# Patient Record
Sex: FEMALE | Race: WHITE | HISPANIC OR LATINO | Employment: FULL TIME | URBAN - METROPOLITAN AREA
[De-identification: names, ages, dates, MRNs, and addresses within clinical notes are randomized per-mention and may not be internally consistent; named-entity substitution may affect disease eponyms.]

---

## 2019-10-17 ENCOUNTER — GYNECOLOGY VISIT (OUTPATIENT)
Dept: OBGYN | Facility: CLINIC | Age: 34
End: 2019-10-17
Payer: COMMERCIAL

## 2019-10-17 VITALS — WEIGHT: 159 LBS | DIASTOLIC BLOOD PRESSURE: 78 MMHG | SYSTOLIC BLOOD PRESSURE: 118 MMHG

## 2019-10-17 DIAGNOSIS — N93.8 DUB (DYSFUNCTIONAL UTERINE BLEEDING): ICD-10-CM

## 2019-10-17 DIAGNOSIS — Z32.00 ENCOUNTER FOR PREGNANCY TEST, RESULT UNKNOWN: ICD-10-CM

## 2019-10-17 LAB
INT CON NEG: NEGATIVE
INT CON POS: POSITIVE
POC URINE PREGNANCY TEST: POSITIVE

## 2019-10-17 PROCEDURE — 99203 OFFICE O/P NEW LOW 30 MIN: CPT | Mod: 25 | Performed by: OBSTETRICS & GYNECOLOGY

## 2019-10-17 PROCEDURE — 81025 URINE PREGNANCY TEST: CPT | Performed by: OBSTETRICS & GYNECOLOGY

## 2019-10-17 PROCEDURE — 76830 TRANSVAGINAL US NON-OB: CPT | Performed by: OBSTETRICS & GYNECOLOGY

## 2019-10-17 NOTE — PROGRESS NOTES
Chief complaint;  This patient is a 33 y.o. female , No LMP recorded. presents complaining of dysfunctional uterine bleeding.    Review of systems-denies; chest pain, shortness of breath, fever, chills, abdominal pain  Past OB history-  OB History    Para Term  AB Living   1 1 1     1   SAB TAB Ectopic Molar Multiple Live Births             1      # Outcome Date GA Lbr Edgar/2nd Weight Sex Delivery Anes PTL Lv   1 Term 02 39w0d   F Vag-Spont  N ALLIE     Past surgical history-   Past Surgical History:   Procedure Laterality Date   • LUMPECTOMY     • LUMPECTOMY       Past medical history- History reviewed. No pertinent past medical history.  Allergies- Patient has no allergy information on record.  Present medications-   Outpatient Encounter Medications as of 10/17/2019   Medication Sig Dispense Refill   • Prenatal MV-Min-Fe Fum-FA-DHA (PRENATAL 1 PO) Take  by mouth.       No facility-administered encounter medications on file as of 10/17/2019.      Family history- no history of breast or ovarian cancer  Social history-   Social History     Socioeconomic History   • Marital status: Single     Spouse name: Not on file   • Number of children: Not on file   • Years of education: Not on file   • Highest education level: Not on file   Occupational History   • Not on file   Social Needs   • Financial resource strain: Not on file   • Food insecurity:     Worry: Not on file     Inability: Not on file   • Transportation needs:     Medical: Not on file     Non-medical: Not on file   Tobacco Use   • Smoking status: Never Smoker   • Smokeless tobacco: Never Used   Substance and Sexual Activity   • Alcohol use: Not Currently   • Drug use: Never   • Sexual activity: Yes     Partners: Male   Lifestyle   • Physical activity:     Days per week: Not on file     Minutes per session: Not on file   • Stress: Not on file   Relationships   • Social connections:     Talks on phone: Not on file     Gets together: Not  on file     Attends [employer] service: Not on file     Active member of club or organization: Not on file     Attends meetings of clubs or organizations: Not on file     Relationship status: Not on file   • Intimate partner violence:     Fear of current or ex partner: Not on file     Emotionally abused: Not on file     Physically abused: Not on file     Forced sexual activity: Not on file   Other Topics Concern   • Not on file   Social History Narrative   • Not on file         Physical examination;   Alert and oriented x3  General-well-developed well-nourished female in no apparent distress  Vitals:    10/17/19 0855   BP: 118/78   Weight: 72.1 kg (159 lb)     Skin is warm and dry   HEENT-normocephalic,nontraumatic,PERRLA,EOMI  Throat- no edema or erythema  Neck-supple  Cardiovascular-regular rate and rhythm, normal S1-S2 without murmurs or gallops  Lungs-clear to auscultation bilaterally  Back-negative CVA tenderness  Abdomen-nondistended positive bowel sounds soft nontender without masses or hepatosplenomegaly  Pelvic examination;  External genitalia-without lesions  Vagina-no blood, no discharge  Cervix-closed,no gross lesions  Uterus-  12 weeks size, nontender  Adnexa- without mass or tenderness  Extremities-without cyanosis clubbing or edema  Neurologic-grossly intact    Transvaginal ultrasound; as performed and read by myself; intrauterine gestational sac containing sherwood pregnancy positive fetal and cardiac motion crown-rump length consistent with 12 weeks 3 days, EDC 4/27/2020 no free pelvic fluid, no adnexal masses    Impression;  Dysfunctional uterine bleeding-no evidence of ectopic or miscarriage.      Plan;     Needs initial OB-patient will start her OB care with Dr. Salazar      35 Minutes total spent with the patient in face-to-face contact,5 minutes of total time utilized to perform  Ultrasound, greater than 50% of the time has been spent on counseling and coordination of care. Many questions  answered regarding possible miscarriage.

## 2020-04-19 ENCOUNTER — HOSPITAL ENCOUNTER (INPATIENT)
Facility: MEDICAL CENTER | Age: 35
LOS: 1 days | End: 2020-04-20
Attending: OBSTETRICS & GYNECOLOGY | Admitting: OBSTETRICS & GYNECOLOGY
Payer: COMMERCIAL

## 2020-04-19 LAB
BASOPHILS # BLD AUTO: 0.2 % (ref 0–1.8)
BASOPHILS # BLD: 0.03 K/UL (ref 0–0.12)
EOSINOPHIL # BLD AUTO: 0.01 K/UL (ref 0–0.51)
EOSINOPHIL NFR BLD: 0.1 % (ref 0–6.9)
ERYTHROCYTE [DISTWIDTH] IN BLOOD BY AUTOMATED COUNT: 43.4 FL (ref 35.9–50)
ERYTHROCYTE [DISTWIDTH] IN BLOOD BY AUTOMATED COUNT: 44.2 FL (ref 35.9–50)
HCT VFR BLD AUTO: 36.2 % (ref 37–47)
HCT VFR BLD AUTO: 38.8 % (ref 37–47)
HGB BLD-MCNC: 12.2 G/DL (ref 12–16)
HGB BLD-MCNC: 13.7 G/DL (ref 12–16)
HOLDING TUBE BB 8507: NORMAL
IMM GRANULOCYTES # BLD AUTO: 0.04 K/UL (ref 0–0.11)
IMM GRANULOCYTES NFR BLD AUTO: 0.3 % (ref 0–0.9)
LYMPHOCYTES # BLD AUTO: 3.16 K/UL (ref 1–4.8)
LYMPHOCYTES NFR BLD: 25.3 % (ref 22–41)
MCH RBC QN AUTO: 30.8 PG (ref 27–33)
MCH RBC QN AUTO: 31.9 PG (ref 27–33)
MCHC RBC AUTO-ENTMCNC: 33.7 G/DL (ref 33.6–35)
MCHC RBC AUTO-ENTMCNC: 35.3 G/DL (ref 33.6–35)
MCV RBC AUTO: 90.2 FL (ref 81.4–97.8)
MCV RBC AUTO: 91.4 FL (ref 81.4–97.8)
MONOCYTES # BLD AUTO: 1.4 K/UL (ref 0–0.85)
MONOCYTES NFR BLD AUTO: 11.2 % (ref 0–13.4)
NEUTROPHILS # BLD AUTO: 7.86 K/UL (ref 2–7.15)
NEUTROPHILS NFR BLD: 62.9 % (ref 44–72)
NRBC # BLD AUTO: 0 K/UL
NRBC BLD-RTO: 0 /100 WBC
PLATELET # BLD AUTO: 141 K/UL (ref 164–446)
PLATELET # BLD AUTO: 165 K/UL (ref 164–446)
PMV BLD AUTO: 12.2 FL (ref 9–12.9)
PMV BLD AUTO: 12.7 FL (ref 9–12.9)
RBC # BLD AUTO: 3.96 M/UL (ref 4.2–5.4)
RBC # BLD AUTO: 4.3 M/UL (ref 4.2–5.4)
WBC # BLD AUTO: 12.5 K/UL (ref 4.8–10.8)
WBC # BLD AUTO: 16.8 K/UL (ref 4.8–10.8)

## 2020-04-19 PROCEDURE — 85025 COMPLETE CBC W/AUTO DIFF WBC: CPT

## 2020-04-19 PROCEDURE — A9270 NON-COVERED ITEM OR SERVICE: HCPCS | Performed by: OBSTETRICS & GYNECOLOGY

## 2020-04-19 PROCEDURE — 10907ZC DRAINAGE OF AMNIOTIC FLUID, THERAPEUTIC FROM PRODUCTS OF CONCEPTION, VIA NATURAL OR ARTIFICIAL OPENING: ICD-10-PCS | Performed by: OBSTETRICS & GYNECOLOGY

## 2020-04-19 PROCEDURE — 59409 OBSTETRICAL CARE: CPT

## 2020-04-19 PROCEDURE — 770002 HCHG ROOM/CARE - OB PRIVATE (112)

## 2020-04-19 PROCEDURE — 36415 COLL VENOUS BLD VENIPUNCTURE: CPT

## 2020-04-19 PROCEDURE — 0HQ9XZZ REPAIR PERINEUM SKIN, EXTERNAL APPROACH: ICD-10-PCS | Performed by: OBSTETRICS & GYNECOLOGY

## 2020-04-19 PROCEDURE — 700112 HCHG RX REV CODE 229: Performed by: OBSTETRICS & GYNECOLOGY

## 2020-04-19 PROCEDURE — 700105 HCHG RX REV CODE 258

## 2020-04-19 PROCEDURE — 700101 HCHG RX REV CODE 250

## 2020-04-19 PROCEDURE — 85027 COMPLETE CBC AUTOMATED: CPT

## 2020-04-19 PROCEDURE — 700102 HCHG RX REV CODE 250 W/ 637 OVERRIDE(OP): Performed by: OBSTETRICS & GYNECOLOGY

## 2020-04-19 PROCEDURE — 304965 HCHG RECOVERY SERVICES

## 2020-04-19 PROCEDURE — 700111 HCHG RX REV CODE 636 W/ 250 OVERRIDE (IP): Performed by: OBSTETRICS & GYNECOLOGY

## 2020-04-19 RX ORDER — MISOPROSTOL 200 UG/1
800 TABLET ORAL
Status: DISCONTINUED | OUTPATIENT
Start: 2020-04-19 | End: 2020-04-19 | Stop reason: HOSPADM

## 2020-04-19 RX ORDER — BISACODYL 10 MG
10 SUPPOSITORY, RECTAL RECTAL PRN
Status: DISCONTINUED | OUTPATIENT
Start: 2020-04-19 | End: 2020-04-20 | Stop reason: HOSPADM

## 2020-04-19 RX ORDER — ONDANSETRON 4 MG/1
4 TABLET, ORALLY DISINTEGRATING ORAL EVERY 6 HOURS PRN
Status: DISCONTINUED | OUTPATIENT
Start: 2020-04-19 | End: 2020-04-20 | Stop reason: HOSPADM

## 2020-04-19 RX ORDER — IBUPROFEN 600 MG/1
600 TABLET ORAL EVERY 6 HOURS PRN
Status: DISCONTINUED | OUTPATIENT
Start: 2020-04-19 | End: 2020-04-20 | Stop reason: HOSPADM

## 2020-04-19 RX ORDER — CARBOPROST TROMETHAMINE 250 UG/ML
250 INJECTION, SOLUTION INTRAMUSCULAR
Status: DISCONTINUED | OUTPATIENT
Start: 2020-04-19 | End: 2020-04-20 | Stop reason: HOSPADM

## 2020-04-19 RX ORDER — OXYCODONE HYDROCHLORIDE AND ACETAMINOPHEN 5; 325 MG/1; MG/1
1 TABLET ORAL EVERY 4 HOURS PRN
Status: DISCONTINUED | OUTPATIENT
Start: 2020-04-19 | End: 2020-04-20 | Stop reason: HOSPADM

## 2020-04-19 RX ORDER — DOCUSATE SODIUM 100 MG/1
100 CAPSULE, LIQUID FILLED ORAL 2 TIMES DAILY PRN
Status: DISCONTINUED | OUTPATIENT
Start: 2020-04-19 | End: 2020-04-20 | Stop reason: HOSPADM

## 2020-04-19 RX ORDER — SODIUM CHLORIDE, SODIUM LACTATE, POTASSIUM CHLORIDE, CALCIUM CHLORIDE 600; 310; 30; 20 MG/100ML; MG/100ML; MG/100ML; MG/100ML
INJECTION, SOLUTION INTRAVENOUS CONTINUOUS
Status: ACTIVE | OUTPATIENT
Start: 2020-04-19 | End: 2020-04-19

## 2020-04-19 RX ORDER — SODIUM CHLORIDE, SODIUM LACTATE, POTASSIUM CHLORIDE, CALCIUM CHLORIDE 600; 310; 30; 20 MG/100ML; MG/100ML; MG/100ML; MG/100ML
INJECTION, SOLUTION INTRAVENOUS
Status: COMPLETED
Start: 2020-04-19 | End: 2020-04-19

## 2020-04-19 RX ORDER — DEXTROSE, SODIUM CHLORIDE, SODIUM LACTATE, POTASSIUM CHLORIDE, AND CALCIUM CHLORIDE 5; .6; .31; .03; .02 G/100ML; G/100ML; G/100ML; G/100ML; G/100ML
INJECTION, SOLUTION INTRAVENOUS CONTINUOUS
Status: DISCONTINUED | OUTPATIENT
Start: 2020-04-19 | End: 2020-04-20 | Stop reason: HOSPADM

## 2020-04-19 RX ORDER — SODIUM CHLORIDE, SODIUM LACTATE, POTASSIUM CHLORIDE, CALCIUM CHLORIDE 600; 310; 30; 20 MG/100ML; MG/100ML; MG/100ML; MG/100ML
INJECTION, SOLUTION INTRAVENOUS PRN
Status: DISCONTINUED | OUTPATIENT
Start: 2020-04-19 | End: 2020-04-20 | Stop reason: HOSPADM

## 2020-04-19 RX ORDER — DOCUSATE SODIUM 100 MG/1
100 CAPSULE, LIQUID FILLED ORAL 2 TIMES DAILY
Status: DISCONTINUED | OUTPATIENT
Start: 2020-04-19 | End: 2020-04-20 | Stop reason: HOSPADM

## 2020-04-19 RX ORDER — METHYLERGONOVINE MALEATE 0.2 MG/ML
0.2 INJECTION INTRAVENOUS
Status: DISCONTINUED | OUTPATIENT
Start: 2020-04-19 | End: 2020-04-20 | Stop reason: HOSPADM

## 2020-04-19 RX ORDER — OXYCODONE HYDROCHLORIDE AND ACETAMINOPHEN 5; 325 MG/1; MG/1
1 TABLET ORAL EVERY 4 HOURS PRN
Status: DISCONTINUED | OUTPATIENT
Start: 2020-04-19 | End: 2020-04-19

## 2020-04-19 RX ORDER — ONDANSETRON 2 MG/ML
4 INJECTION INTRAMUSCULAR; INTRAVENOUS EVERY 6 HOURS PRN
Status: DISCONTINUED | OUTPATIENT
Start: 2020-04-19 | End: 2020-04-20 | Stop reason: HOSPADM

## 2020-04-19 RX ORDER — OXYCODONE AND ACETAMINOPHEN 10; 325 MG/1; MG/1
1 TABLET ORAL EVERY 4 HOURS PRN
Status: DISCONTINUED | OUTPATIENT
Start: 2020-04-19 | End: 2020-04-20 | Stop reason: HOSPADM

## 2020-04-19 RX ORDER — IBUPROFEN 600 MG/1
600 TABLET ORAL EVERY 6 HOURS PRN
Status: DISCONTINUED | OUTPATIENT
Start: 2020-04-19 | End: 2020-04-19

## 2020-04-19 RX ORDER — MISOPROSTOL 200 UG/1
600 TABLET ORAL
Status: DISCONTINUED | OUTPATIENT
Start: 2020-04-19 | End: 2020-04-20 | Stop reason: HOSPADM

## 2020-04-19 RX ORDER — METHYLERGONOVINE MALEATE 0.2 MG/ML
0.2 INJECTION INTRAVENOUS
Status: DISCONTINUED | OUTPATIENT
Start: 2020-04-19 | End: 2020-04-19 | Stop reason: HOSPADM

## 2020-04-19 RX ORDER — LIDOCAINE HYDROCHLORIDE 10 MG/ML
INJECTION, SOLUTION INFILTRATION; PERINEURAL
Status: COMPLETED
Start: 2020-04-19 | End: 2020-04-19

## 2020-04-19 RX ADMIN — OXYTOCIN 999 ML/HR: 10 INJECTION, SOLUTION INTRAMUSCULAR; INTRAVENOUS at 05:33

## 2020-04-19 RX ADMIN — DOCUSATE SODIUM 100 MG: 100 CAPSULE, LIQUID FILLED ORAL at 21:03

## 2020-04-19 RX ADMIN — LIDOCAINE HYDROCHLORIDE: 10 INJECTION, SOLUTION INFILTRATION; PERINEURAL at 05:45

## 2020-04-19 RX ADMIN — DOCUSATE SODIUM 100 MG: 100 CAPSULE, LIQUID FILLED ORAL at 06:30

## 2020-04-19 RX ADMIN — OXYTOCIN 125 ML/HR: 10 INJECTION, SOLUTION INTRAMUSCULAR; INTRAVENOUS at 06:23

## 2020-04-19 RX ADMIN — IBUPROFEN 600 MG: 600 TABLET ORAL at 06:30

## 2020-04-19 RX ADMIN — SODIUM CHLORIDE, SODIUM LACTATE, POTASSIUM CHLORIDE, CALCIUM CHLORIDE: 600; 310; 30; 20 INJECTION, SOLUTION INTRAVENOUS at 03:52

## 2020-04-19 RX ADMIN — FENTANYL CITRATE 100 MCG: 0.05 INJECTION, SOLUTION INTRAMUSCULAR; INTRAVENOUS at 04:32

## 2020-04-19 RX ADMIN — SODIUM CHLORIDE, POTASSIUM CHLORIDE, SODIUM LACTATE AND CALCIUM CHLORIDE: 600; 310; 30; 20 INJECTION, SOLUTION INTRAVENOUS at 03:52

## 2020-04-19 RX ADMIN — IBUPROFEN 600 MG: 600 TABLET ORAL at 21:03

## 2020-04-19 SDOH — ECONOMIC STABILITY: TRANSPORTATION INSECURITY
IN THE PAST 12 MONTHS, HAS LACK OF TRANSPORTATION KEPT YOU FROM MEETINGS, WORK, OR FROM GETTING THINGS NEEDED FOR DAILY LIVING?: PATIENT DECLINED

## 2020-04-19 SDOH — ECONOMIC STABILITY: FOOD INSECURITY: WITHIN THE PAST 12 MONTHS, YOU WORRIED THAT YOUR FOOD WOULD RUN OUT BEFORE YOU GOT MONEY TO BUY MORE.: PATIENT DECLINED

## 2020-04-19 SDOH — ECONOMIC STABILITY: FOOD INSECURITY: WITHIN THE PAST 12 MONTHS, THE FOOD YOU BOUGHT JUST DIDN'T LAST AND YOU DIDN'T HAVE MONEY TO GET MORE.: PATIENT DECLINED

## 2020-04-19 SDOH — ECONOMIC STABILITY: TRANSPORTATION INSECURITY
IN THE PAST 12 MONTHS, HAS THE LACK OF TRANSPORTATION KEPT YOU FROM MEDICAL APPOINTMENTS OR FROM GETTING MEDICATIONS?: PATIENT DECLINED

## 2020-04-19 ASSESSMENT — LIFESTYLE VARIABLES
EVER FELT BAD OR GUILTY ABOUT YOUR DRINKING: NO
ALCOHOL_USE: NO
EVER_SMOKED: NEVER
DOES PATIENT WANT TO STOP DRINKING: NO
HAVE YOU EVER FELT YOU SHOULD CUT DOWN ON YOUR DRINKING: NO
EVER HAD A DRINK FIRST THING IN THE MORNING TO STEADY YOUR NERVES TO GET RID OF A HANGOVER: NO

## 2020-04-19 ASSESSMENT — PATIENT HEALTH QUESTIONNAIRE - PHQ9
2. FEELING DOWN, DEPRESSED, IRRITABLE, OR HOPELESS: NOT AT ALL
1. LITTLE INTEREST OR PLEASURE IN DOING THINGS: NOT AT ALL
SUM OF ALL RESPONSES TO PHQ9 QUESTIONS 1 AND 2: 0

## 2020-04-19 NOTE — H&P
"  History and Physical    Pura Velarde is a 34 y.o. female  -Para:     Gestational Age:  38w6d  Admitted for:   Active Labor  Admitted to  [facility].  Patient received prenatal care: Dr Salazar    HPI: Patient is admitted with the above mentioned Chief Complaint and States   Loss of fluid:   negative  Abdominal Pain:  positive  Uterine Contractions:  positive  Vaginal Bleeding:  negative  Fetal Movement:  normal  Patient denies fever, chills, nausea, vomiting , headache, visual disturbance, or dysuria  No LMP recorded. Patient is pregnant.  Estimated Date of Delivery: 20  Final ENDER: 2020, by Patient Reported    Patient Active Problem List    Diagnosis Date Noted   • DUB (dysfunctional uterine bleeding) 10/17/2019       Admitting DX: Pregnancy  Labor and delivery, indication for care   Pregnancy Complications:  none  OB Risk Factors:   none  Labor State:    Active phase labor.    History:   has no past medical history on file.     has a past surgical history that includes lumpectomy and lumpectomy.    OB History    Para Term  AB Living   2 1 1     1   SAB TAB Ectopic Molar Multiple Live Births             1      # Outcome Date GA Lbr Edgar/2nd Weight Sex Delivery Anes PTL Lv   2 Current            1 Term 02 39w0d   F Vag-Spont  N ALLIE       Medications:  No current facility-administered medications on file prior to encounter.      Current Outpatient Medications on File Prior to Encounter   Medication Sig Dispense Refill   • Prenatal MV-Min-Fe Fum-FA-DHA (PRENATAL 1 PO) Take  by mouth.         Allergies:  Patient has no known allergies.    ROS:   Neuro: negative    Cardiovascular: negative  Gastro intestinal: negative  Genitourinary: negative            Physical Exam:  Ht 1.651 m (5' 5\")   Wt 81.2 kg (179 lb)   BMI 29.79 kg/m²   Constitutional: healthy-appearing  Cardio: regular rate and rhythm  Lung: CTA bilaterally  Abdomen: gravid, " leopolds 7 #  Extremity: extremities, peripheral pulses and reflexes normal    Cervical Exam: 80%  Cervix Dilatation: 6  Station: negative 1  Pelvis: Normal  Fetal Assessment: Fetal heart variability: moderate  Estimated Fetal Weight: 3000 - 3500g      Labs:  Recent Labs     04/19/20  0340   WBC 12.5*   RBC 4.30   HEMOGLOBIN 13.7   HEMATOCRIT 38.8   MCV 90.2   MCH 31.9   MCHC 35.3*   RDW 43.4   PLATELETCT 165   MPV 12.7     Prenatal Results     General (Most Recent Result)     Test Value Date Time    ABO       Rh       Antibody screen       HbA1c       Gonorrhea       Chlamydia  by PCR       Chlamydia by DNA       RPR/Syphilus       HSV 1/2 by PCR (non-serum)       HSV 1/2 (serum)       HSV 1       HSV 2       HPV (16)       HPV (18)       HPV (other)       HBsAg       HIV-1 HIV-2 Antibodies       Rubella       Tb             Pap Smear (Most Recent Result)     Test Value Date Time    Pap smear       Pap smear w/HPV       Pap smear w/CTNG       Pap smar w/HPV CTNG       Pap smear (reflex HPV ACUS)       Pap smear (reflex HPV ASCUS w/CTNG)       Pathology gyn specimen             Urinalysis (Most Recent Result)     Test Value Date Time    Urinalysis       Urinalysis (culture if indicated)       POC urinalysis       Urine drug screen       Urine drug screen (w/o conf)       Urine culture ([id number])       Urine culture ([id number])             1st Trimester     Test Value Date Time    Hgb       Hct       Fasting Glucose Tolerance       GTT, 1 hour       GTT, 2 hours       GTT, 3 hours             2nd Trimester     Test Value Date Time    Hgb       Hct       Urinalysis       Urine Culture       AST       ALT       Uric Acid       Fasting Glucose Tolerance       GTT, 1 hour       GTT, 2 hours       GTT, 3 hours       Urine Protein/Creatinine Ratio             3rd Trimester     Test Value Date Time    Hgb 13.7 g/dL 04/19/20 0340    Hct 38.8 % 04/19/20 0340    Platelet count 165 K/uL 04/19/20 0340    GBS (LARSON BROTH)        GBS (Direct)       Urinalysis       Urine Culture       Urine Drug Screen       Urine Protein/Creatinine Ratio             Congenital Disease Screening     Test Value Date Time    First Trimester Screen       Quad Screen       Sickle Cell       Thalassemia       Hakeem-Sachs       Cystic Fibrosis Carrier Study                     Assessment:  Gestational Age:  38w6d  Labor State:   Labor, Active  Risk Factors:   none  Pregnancy Complications: none    Patient Active Problem List    Diagnosis Date Noted   • DUB (dysfunctional uterine bleeding) 10/17/2019       Plan:   Admitted for: Active Labor    CBC  Antibiotics: none  Yvonne Salazar M.D.

## 2020-04-19 NOTE — L&D DELIVERY NOTE
Delivery note  , female in LJ position. Placenta intact, 3 vc. 1st degree midline laceration repaired with local lidocaine and 2-0 vicryl ct-1 needle. Apgars 9 and 9, weight : 7# 4.8 oz. Mom and baby doing well.

## 2020-04-19 NOTE — PROGRESS NOTES
"Labor Progress Note    Pura Velarde   38w6d      Subjective:  Pt with contractions since 23:00. Pt was 4 cm upon admission and now 8 cm. Pt declines epidural.  Uterine contractions:yes  Pain: Yes. Severity: moderate    Objective:   Vitals:    20 0300 20 0411   BP:  126/85   Pulse:  75   Temp:  37 °C (98.6 °F)   TempSrc:  Temporal   Weight: 81.2 kg (179 lb)    Height: 1.651 m (5' 5\")      Fetal heart variability: 140's category 1  [place]: q 2  SVE: 8/c/0, arom, clear      Membranes ruptured: .yes, clear    Meds:   Epidural : .no  Pitocin: .no    Labs:  Recent Results (from the past 24 hour(s))   Hold Blood Bank Specimen (Not Tested)    Collection Time: 20  3:40 AM   Result Value Ref Range    Holding Tube - Bb DONE    CBC WITH DIFFERENTIAL    Collection Time: 20  3:40 AM   Result Value Ref Range    WBC 12.5 (H) 4.8 - 10.8 K/uL    RBC 4.30 4.20 - 5.40 M/uL    Hemoglobin 13.7 12.0 - 16.0 g/dL    Hematocrit 38.8 37.0 - 47.0 %    MCV 90.2 81.4 - 97.8 fL    MCH 31.9 27.0 - 33.0 pg    MCHC 35.3 (H) 33.6 - 35.0 g/dL    RDW 43.4 35.9 - 50.0 fL    Platelet Count 165 164 - 446 K/uL    MPV 12.7 9.0 - 12.9 fL    Neutrophils-Polys 62.90 44.00 - 72.00 %    Lymphocytes 25.30 22.00 - 41.00 %    Monocytes 11.20 0.00 - 13.40 %    Eosinophils 0.10 0.00 - 6.90 %    Basophils 0.20 0.00 - 1.80 %    Immature Granulocytes 0.30 0.00 - 0.90 %    Nucleated RBC 0.00 /100 WBC    Neutrophils (Absolute) 7.86 (H) 2.00 - 7.15 K/uL    Lymphs (Absolute) 3.16 1.00 - 4.80 K/uL    Monos (Absolute) 1.40 (H) 0.00 - 0.85 K/uL    Eos (Absolute) 0.01 0.00 - 0.51 K/uL    Baso (Absolute) 0.03 0.00 - 0.12 K/uL    Immature Granulocytes (abs) 0.04 0.00 - 0.11 K/uL    NRBC (Absolute) 0.00 K/uL       Assessment:   38w6d/active labor-expt   GBBS-negative  AMA- neg NIPT and neg MSAFP, pt declined MFM referral.  Fetal status-reassuring        Yvonne Salazar M.D.          "

## 2020-04-19 NOTE — PROGRESS NOTES
Pt up from L&D via wheelchair. Assessment complete. VSS. Fundus firm, lochia light. Pt oriented to room, call light, emergency light, bulb syringe and putting baby on back to sleep. Call light within reach. Will continue to monitor.

## 2020-04-19 NOTE — PROGRESS NOTES
Late Entry    Report received from AIDE Lao RN. Pt remained firm/light/U. Pt denied needs at this time.     Pt ambulated to BR. Was Unable to void. Pericare provided. Pt ambulated to wheelchair and was transferred to PPU. BS report given to Lila PERES.

## 2020-04-19 NOTE — CARE PLAN
Problem: Altered physiologic condition related to immediate post-delivery state and potential for bleeding/hemorrhage  Goal: Patient physiologically stable as evidenced by normal lochia, palpable uterine involution and vital signs within normal limits  Outcome: PROGRESSING AS EXPECTED   Fundus firm, lochia light  Problem: Potential for postpartum infection related to presence of episiotomy/vaginal tear and/or uterine contamination  Goal: Patient will be absent from signs and symptoms of infection  Outcome: PROGRESSING AS EXPECTED   Pt remains afebrile and free from signs of infection

## 2020-04-19 NOTE — L&D DELIVERY NOTE
DATE OF SERVICE:  2020    ADMITTING DIAGNOSES:  1.  Intrauterine pregnancy at 38 weeks and 6 days.  2.  Active labor.  3.  Advanced maternal age, declined Maternal Fetal Medicine referral.  I had   negative noninvasive prenatal testing, negative maternal serum   alpha-fetoprotein.  4.  Group B streptococcus negative.    PROCEDURES:  1.  Admission to labor and delivery.  2.  IV fentanyl for pain.  3.  Normal spontaneous vaginal delivery of a vigorous female with Apgars 9 and   9.    PROCEDURE:  This patient is a 34-year-old  2, para 1 that was admitted   at 38 weeks and 6 days in active labor.  When the patient arrived, she was 4   cm dilated.  She was admitted.  She declined an epidural.  She progressed to 8   cm dilated and at 4:38 a.m. when she was 8 cm dilated, she had artificial   rupture of membranes, which was clear.  The fetal status was reassuring,   category 1.  No antibiotics were given since she was GBS negative.  The   patient then progressed to complete at 5:08 in the morning.  She was then   prepped and draped in the usual sterile fashion.  At 5:32, I assisted with a   normal spontaneous vaginal delivery of a vigorous female in LJ position.  The   head was delivered atraumatically and the rest of the infant delivered   without any problems.  Mouth and nose bulb suctioned.  Infant placed on   maternal abdomen.  Delayed cord clamping was performed for 2-1/2 minutes and   then the cord was doubly clamped and cut by the infant's father.  The placenta   was then delivered intact at 5:38 a.m.  Uterus is firm and hemostatic with an   EBL of 300.  The patient did have a first-degree midline laceration that was   repaired with local lidocaine and 2-0 Vicryl on a CT-1 needle without any   problems.  The patient tolerated the procedure well.  The patient and baby are   doing well.  This was again a vigorous female with Apgars 9 and 9.  Infant's   weight 3310 g or 7 pounds 4.8 ounces.  .        ____________________________________     MD BRIGIDA RAMIREZ / SHANEKA    DD:  04/19/2020 06:08:57  DT:  04/19/2020 08:33:06    D#:  [id number]  Job#:  [id number]

## 2020-04-19 NOTE — PROGRESS NOTES
ENDER   38.6 weeks    Pt presents in active labor. Pt reports +FM and denies VB or LOF. EFM and TOCO applied. VSS. SVE -/-2    Report to Dr Salazar. Orders received to admit.    0435_ Dr Salazar @ [facility]. ROM clear fluid. SVE /-2    0532_  viable female apgars 9    0538_ Placenta delivered S/I

## 2020-04-19 NOTE — H&P
CHIEF COMPLAINT:  Contractions since 11:00 p.m.    HISTORY OF PRESENT ILLNESS:  This patient is a 34-year-old  2, para 1,    female who has had prenatal care with me.  She comes in   complaining of contractions.  She was found to be 4 cm dilated and has now   changed to 8 cm.  The patient's prenatal care has been uncomplicated.  She is   GBS negative.  The patient declines an epidural.    PAST MEDICAL HISTORY:  None.    PAST SURGICAL HISTORY:  None.    MEDICINES:  She is on prenatal vitamins.    ALLERGIES:  No known drug allergies.    OBSTETRICAL HISTORY:  She is a  2, para 1.  Previously, the patient had   a normal spontaneous vaginal delivery at 39 weeks, uncomplicated delivery, 5   pounds 12 ounces.    GYNECOLOGIC HISTORY:  The patient started menstruating at age 10, has   menstrual cycles every 28 days, last about 5 days.  No history of STDs.  No   history of HSV.  Her last Pap smear was in 2019, it was negative.    PHYSICAL EXAMINATION:  VITAL SIGNS:  Stable.  She is afebrile.  GENERAL:  Pleasant female, in some distress secondary to contractions.  LUNGS:  Clear to auscultation bilaterally.  CARDIOVASCULAR:  Regular rate and rhythm.  No murmur.  ABDOMEN:  Soft, gravid.  Leopold's to 7-1/2 pounds.  EXTREMITIES:  No calf tenderness.  PELVIC:  Fetal heart tones 130s, category 1.  [place], aaron every 1-2   minutes.  Sterile vaginal exam, 8 cm dilated, completely effaced and 0   station.  Artificial rupture of membranes performed and it was clear.    LABORATORY DATA:  The patient is GBS negative.  RPR nonreactive.  TSH is   normal at 1.60.  H and H 13.0 and 38.4.  Her 1-hour glucose was 122.    Noninvasive prenatal test negative.  She has a female.  MSAFP is negative.    The rest of her prenatal labs, the patient is Rh positive, she is O positive,   antibody screen negative, HIV nonreactive, hepatitis B surface antigen   negative, rubella immune.  Negative cystic fibrosis, negative  Fragile X,   negative SMA.  GBS is negative.    ASSESSMENT AND PLAN:  A 34-year-old  2, para 1, at 38 weeks and 6 days   by dates and ultrasound.  1.  Active labor.  The patient has been admitted.  We will expect a normal   spontaneous vaginal delivery.  She declines an epidural.  2.  Group B Streptococcus negative.  3.  Advanced maternal age, negative noninvasive prenatal testing, negative   maternal serum alpha-fetoprotein.  The patient declined maternal-fetal   medicine referral.  4.  Fetal status reassuring.       ____________________________________     MD FACUNDO RAMIREZH / NTS    DD:  2020 05:05:01  DT:  2020 06:05:12    D#:  [id number]  Job#:  [id number]

## 2020-04-20 VITALS
WEIGHT: 179 LBS | DIASTOLIC BLOOD PRESSURE: 74 MMHG | SYSTOLIC BLOOD PRESSURE: 115 MMHG | RESPIRATION RATE: 16 BRPM | HEART RATE: 60 BPM | BODY MASS INDEX: 29.82 KG/M2 | OXYGEN SATURATION: 98 % | TEMPERATURE: 98.4 F | HEIGHT: 65 IN

## 2020-04-20 PROCEDURE — 700112 HCHG RX REV CODE 229: Performed by: OBSTETRICS & GYNECOLOGY

## 2020-04-20 PROCEDURE — A9270 NON-COVERED ITEM OR SERVICE: HCPCS | Performed by: OBSTETRICS & GYNECOLOGY

## 2020-04-20 RX ORDER — IBUPROFEN 600 MG/1
600 TABLET ORAL EVERY 6 HOURS PRN
Qty: 30 TAB | Refills: 60 | Status: SHIPPED | OUTPATIENT
Start: 2020-04-20 | End: 2024-01-15

## 2020-04-20 RX ADMIN — DOCUSATE SODIUM 100 MG: 100 CAPSULE, LIQUID FILLED ORAL at 06:20

## 2020-04-20 ASSESSMENT — EDINBURGH POSTNATAL DEPRESSION SCALE (EPDS)
I HAVE BEEN SO UNHAPPY THAT I HAVE HAD DIFFICULTY SLEEPING: NOT AT ALL
THINGS HAVE BEEN GETTING ON TOP OF ME: NO, MOST OF THE TIME I HAVE COPED QUITE WELL
I HAVE BLAMED MYSELF UNNECESSARILY WHEN THINGS WENT WRONG: NOT VERY OFTEN
I HAVE BEEN ANXIOUS OR WORRIED FOR NO GOOD REASON: NO, NOT AT ALL
THE THOUGHT OF HARMING MYSELF HAS OCCURRED TO ME: NEVER
I HAVE LOOKED FORWARD WITH ENJOYMENT TO THINGS: AS MUCH AS I EVER DID
I HAVE BEEN SO UNHAPPY THAT I HAVE BEEN CRYING: NO, NEVER
I HAVE BEEN ABLE TO LAUGH AND SEE THE FUNNY SIDE OF THINGS: AS MUCH AS I ALWAYS COULD
I HAVE FELT SCARED OR PANICKY FOR NO GOOD REASON: NO, NOT AT ALL
I HAVE FELT SAD OR MISERABLE: NO, NOT AT ALL

## 2020-04-20 NOTE — DISCHARGE INSTRUCTIONS
POSTPARTUM DISCHARGE INSTRUCTIONS FOR MOM    YOB: 1985   Age: 34 y.o.               Admit Date: 4/19/2020     Discharge Date: 4/20/2020  Attending Doctor:  Yvonne Salazar M.D.                  Allergies:  Patient has no known allergies.    Discharged to home by car. Discharged via wheelchair, hospital escort: Yes.  Special equipment needed: Not Applicable  Belongings with: Personal  Be sure to schedule a follow-up appointment with your primary care doctor or any specialists as instructed.     Discharge Plan:   Diet Plan: Discussed  Activity Level: Discussed  Confirmed Follow up Appointment: Appointment Scheduled  Confirmed Symptoms Management: Discussed  Medication Reconciliation Updated: No (Comments)  Influenza Vaccine Indication: Not indicated: Previously immunized this influenza season and > 8 years of age    REASONS TO CALL YOUR OBSTETRICIAN:  1.   Persistent fever or shaking chills (Temperature higher than 100.4)  2.   Heavy bleeding (soaking more than 1 pad per hour); Passing clots  3.   Foul odor from vagina  4.   Mastitis (Breast infection; breast pain, chills, fever, redness)  5.   Urinary pain, burning or frequency  6.   Severe depression longer than 24 hours    HAND WASHING  · Prior to handling the baby.  · Before breastfeeding or bottle feeding baby.  · After using the bathroom or changing the baby's diaper.    VAGINAL CARE  · Nothing inside vagina for 6 weeks: no sexual intercourse, tampons or douching.  · Bleeding may continue for 2-4 weeks.  Amount may vary.    · Call your physician for heavy bleeding which means soaking more than 1 pad per hour    BIRTH CONTROL  · It is possible to become pregnant at any time after delivery and while breastfeeding.  · Plan to discuss a method of birth control with your physician at your follow up visit. visit.    DIET AND ELIMINATION  · Eating more fiber (bran cereal, fruits, and vegetables) and drinking plenty of fluids will help to avoid  "constipation.  · Urinary frequency after childbirth is normal.    POSTPARTUM BLUES  During the first few days after birth, you may experience a sense of the \"blues\" which may include impatience, irritability or even crying.  These feeling come and go quickly.  However, as many as 1 in 10 women experience emotional symptoms known as postpartum depression.    Postpartum depression:  May start as early as the second or third day after delivery or take several weeks or months to develop.  Symptoms of \"blues\" are present, but are more intense:  Crying spells; loss of appetite; feelings of hopelessness or loss of control; fear of touching the baby; over concern or no concern at all about the baby; little or no concern about your own appearance/caring for yourself; and/or inability to sleep or excessive sleeping.  Contact your physician if you are experiencing any of these symptoms.    Crisis Hotline:  · [place] Crisis Hotline:  9-736-HRZTNVC  Or 1-704.763.1314  · [place] Crisis Hotline:  1-943.762.7067  Or 217-246-6731    PREVENTING SHAKEN BABY:  If you are angry or stressed, PUT THE BABY IN THE CRIB, step away, take some deep breaths, and wait until you are calm to care for the baby.  DO NOT SHAKE THE BABY.  You are not alone, call a supporter for help.    · Crisis Call Center 24/7 crisis line 095-769-7423 or 1-140.454.1780  · You can also text them, text \"ANSWER\" to 164544    QUIT SMOKING/TOBACCO USE:  I understand the use of any tobacco products increases my chance of suffering from future heart disease and could cause other illnesses which may shorten my life. Quitting the use of tobacco products is the single most important thing I can do to improve my health. For further information on smoking / tobacco cessation call a Toll Free Quit Line at 1-220.828.3747 (*[employer] [place]) or 1-739.759.3287 (American Lung Association) or you can access the web based program at www.lungusa.org.    · [place] Tobacco Users " Help Line:  (389) 509-4240       Toll Free: 1-353.913.4858  · Quit Tobacco Program [facility] (440)552-8478    DEPRESSION / SUICIDE RISK:  As you are discharged from this [facility], it is important to learn how to keep safe from harming yourself.    Recognize the warning signs:  · Abrupt changes in personality, positive or negative- including increase in energy   · Giving away possessions  · Change in eating patterns- significant weight changes-  positive or negative  · Change in sleeping patterns- unable to sleep or sleeping all the time   · Unwillingness or inability to communicate  · Depression  · Unusual sadness, discouragement and loneliness  · Talk of wanting to die  · Neglect of personal appearance   · Rebelliousness- reckless behavior  · Withdrawal from people/activities they love  · Confusion- inability to concentrate     If you or a loved one observes any of these behaviors or has concerns about self-harm, here's what you can do:  · Talk about it- your feelings and reasons for harming yourself  · Remove any means that you might use to hurt yourself (examples: pills, rope, extension cords, firearm)  · Get professional help from the community (Mental Health, Substance Abuse, psychological counseling)  · Do not be alone:Call your Safe Contact- someone whom you trust who will be there for you.  · Call your local CRISIS HOTLINE 700-1377 or 561-140-6972  · Call your local Children's Mobile Crisis Response Team [place] (548) 229-2888 or www.[employer]  · Call the toll free National Suicide Prevention Hotlines   · National Suicide Prevention Lifeline 510-018-ZYMU (2047)  · National Hope Line Network 800-SUICIDE (470-4785)    DISCHARGE SURVEY:  Thank you for choosing [facility].  We hope we provided you with very good care.  You may be receiving a survey in the mail.  Please fill it out.  Your opinion is valuable to us.    ADDITIONAL EDUCATIONAL MATERIALS GIVEN TO  PATIENT: INJOY education deng        My signature on this form indicates that:  1.  I have reviewed and understand the above information  2.  My questions regarding this information have been answered to my satisfaction.  3.  I have formulated a plan with my discharge nurse to obtain my prescribed medication for home.

## 2020-04-20 NOTE — PROGRESS NOTES
Mother and infant discharge instructions complete by Paulina (discharge RN), parents have no other questions at this time and verbalize understanding of follow-up appointments and when to call MD; mother and infant assessment and VS at baseline; infant placed in car seat by parent and they were escorted out to car by a CNA

## 2020-04-20 NOTE — DISCHARGE SUMMARY
Discharge Summary:      Pura Velarde    Admit Date:   2020  Discharge Date:  2020     Admitting diagnosis:  Pregnancy  Labor and delivery, indication for care  Discharge Diagnosis: Status post vaginal, spontaneous.  Pregnancy Complications: none  Tubal Ligation:  no        History:  No past medical history on file.  OB History    Para Term  AB Living   2 2 2     2   SAB TAB Ectopic Molar Multiple Live Births           0 2      # Outcome Date GA Lbr Edgar/2nd Weight Sex Delivery Anes PTL Lv   2 Term 20 38w6d / 00:24 3.31 kg (7 lb 4.8 oz) F Vag-Spont Local N ALLIE   1 Term 02 39w0d   F Vag-Spont  N ALLIE        Patient has no known allergies.  Patient Active Problem List    Diagnosis Date Noted   • DUB (dysfunctional uterine bleeding) 10/17/2019        Hospital Course:   34 y.o. , now para 2, was admitted with the above mentioned diagnosis, underwent Active Labor, vaginal, spontaneous. Patient postpartum course was unremarkable, with progressive advancement in diet , ambulation and toleration of oral analgesia. Patient without complaints today and desires discharge.      Vitals:    20 1000 20 1400 20 1800 20 0600   BP: 123/71 116/76 113/77 115/74   Pulse: 72 72 67 60   Resp: 18 16 16 16   Temp: 36.7 °C (98.1 °F) 36.4 °C (97.6 °F) 36.7 °C (98 °F) 36.9 °C (98.4 °F)   TempSrc: Temporal Temporal Temporal Temporal   SpO2: 96% 98% 96% 98%   Weight:       Height:           Current Facility-Administered Medications   Medication Dose   • D5LR infusion     • ondansetron (ZOFRAN ODT) dispertab 4 mg  4 mg    Or   • ondansetron (ZOFRAN) syringe/vial injection 4 mg  4 mg   • oxytocin (PITOCIN) infusion (for postpartum)   mL/hr   • ibuprofen (MOTRIN) tablet 600 mg  600 mg   • oxyCODONE-acetaminophen (PERCOCET) 5-325 MG per tablet 1 Tab  1 Tab   • oxyCODONE-acetaminophen (PERCOCET-10)  MG per tablet 1 Tab  1 Tab   • LR infusion     • PRN  oxytocin (PITOCIN) (20 Units/1000 mL) PRN for excessive uterine bleeding - See Admin Instr  125-999 mL/hr   • miSOPROStol (CYTOTEC) tablet 600 mcg  600 mcg   • methylergonovine (METHERGINE) injection 0.2 mg  0.2 mg   • carboPROST (HEMABATE) injection 250 mcg  250 mcg   • docusate sodium (COLACE) capsule 100 mg  100 mg   • magnesium hydroxide (MILK OF MAGNESIA) suspension 30 mL  30 mL   • bisacodyl (DULCOLAX) suppository 10 mg  10 mg   • docusate sodium (COLACE) capsule 100 mg  100 mg       Exam:  Breast Exam: negative  Abdomen: Abdomen soft, non-tender. BS normal. No masses,  No organomegaly  Fundus Non Tender: no  Extremity: extremities, peripheral pulses and reflexes normal     Labs:  Recent Labs     04/19/20  0340 04/19/20  1502   WBC 12.5* 16.8*   RBC 4.30 3.96*   HEMOGLOBIN 13.7 12.2   HEMATOCRIT 38.8 36.2*   MCV 90.2 91.4   MCH 31.9 30.8   MCHC 35.3* 33.7   RDW 43.4 44.2   PLATELETCT 165 141*   MPV 12.7 12.2        Activity:   Discharge to home  Pelvic Rest x 6 weeks    Assessment:  normal postpartum course  Discharge Assessment: No areas of skin breakdown/redness; surgical incision intact/healing     Follow up:6 weeks with Dr Salazar     Discharge Meds:   Current Outpatient Medications   Medication Sig Dispense Refill   • ibuprofen (MOTRIN) 600 MG Tab Take 1 Tab by mouth every 6 hours as needed (For cramping after delivery; do not give if patient is receiving ketorolac (Toradol)). 30 Tab 60       Yvonne Salazar M.D.

## 2020-04-20 NOTE — PROGRESS NOTES
Assumed care. Assessment completed, fundus firm, lochia light. POC reviewed, verbalized understanding. Requests ibuprofen at this time and will call if further pain med intervention needed.   Patient is breastfeeding infant without problems.

## 2020-04-20 NOTE — CARE PLAN
Problem: Altered physiologic condition related to immediate post-delivery state and potential for bleeding/hemorrhage  Goal: Patient physiologically stable as evidenced by normal lochia, palpable uterine involution and vital signs within normal limits  Outcome: PROGRESSING AS EXPECTED  Note: Fundus firm, lochia light. Vital signs remain stable.     Problem: Alteration in comfort related to episiotomy, vaginal repair and/or after birth pains  Goal: Patient is able to ambulate, care for self and infant  Outcome: PROGRESSING AS EXPECTED  Note: Patient is ambulating independently and caring for self and infant. Makes needs known appropriately. Prefers to request pain medications as needed.

## 2020-04-20 NOTE — PROGRESS NOTES
Pt discharge instructions provided. Checked armbands. Clamp and cuddles removed.  Pt states she will call when they are ready for car seat check. No further questions at this time. Iliana RN updated.

## 2022-07-01 ENCOUNTER — TELEPHONE (OUTPATIENT)
Dept: SCHEDULING | Facility: IMAGING CENTER | Age: 37
End: 2022-07-01

## 2022-07-26 ENCOUNTER — OFFICE VISIT (OUTPATIENT)
Dept: MEDICAL GROUP | Facility: PHYSICIAN GROUP | Age: 37
End: 2022-07-26
Payer: COMMERCIAL

## 2022-07-26 VITALS
HEART RATE: 88 BPM | OXYGEN SATURATION: 97 % | SYSTOLIC BLOOD PRESSURE: 110 MMHG | DIASTOLIC BLOOD PRESSURE: 62 MMHG | HEIGHT: 65 IN | WEIGHT: 180 LBS | TEMPERATURE: 98.5 F | BODY MASS INDEX: 29.99 KG/M2

## 2022-07-26 DIAGNOSIS — Z00.00 WELLNESS EXAMINATION: ICD-10-CM

## 2022-07-26 DIAGNOSIS — Z23 NEED FOR VACCINATION: ICD-10-CM

## 2022-07-26 PROBLEM — N93.8 DUB (DYSFUNCTIONAL UTERINE BLEEDING): Status: RESOLVED | Noted: 2019-10-17 | Resolved: 2022-07-26

## 2022-07-26 PROCEDURE — 99395 PREV VISIT EST AGE 18-39: CPT | Performed by: NURSE PRACTITIONER

## 2022-07-26 ASSESSMENT — PATIENT HEALTH QUESTIONNAIRE - PHQ9: CLINICAL INTERPRETATION OF PHQ2 SCORE: 0

## 2022-07-26 NOTE — PROGRESS NOTES
Subjective:     CC:   Chief Complaint   Patient presents with   • Establish Care       HPI:   Pura RED is a 36 y.o. female who presents for annual exam. She is feeling well and denies any complaints.    Ob-Gyn/ History:    Patient has GYN provider: yes  /Para:  2/2  Last Pap Smear:  . no history of abnormal pap smears.  Gyn Surgery:  none.  Current Contraceptive Method:  IUD, paragard. yes currently sexually active.  Last menstrual period:  2 weeks ago.  Periods regular. light bleeding. Cramping is mild.   She does not take OTC analgesics for cramps.  No significant bloating/fluid retention, pelvic pain, or dyspareunia. No vaginal discharge    Health Maintenance  Cholesterol Screening: ordered   Diabetes Screening: ordered   Diet: healthy    Exercise: walking   Substance Abuse: none   Safe in relationship.   Seat belts, bike helmet, gun safety discussed.  Sun protection used.    Cancer screening  Cervical Cancer Screening: up to date     Infectious disease screening/Immunizations  --STI Screening: up to date   --Practices safe sex.  --HIV Screening: completed   --Immunizations: up to date, patient will update COVID vaccine prior to trip to [place]    She  has no past medical history on file.  She  has a past surgical history that includes lumpectomy and lumpectomy.    Family History   Problem Relation Age of Onset   • Hypertension Mother    • Diabetes Father         type2       Social History     Socioeconomic History   • Marital status: Single     Spouse name: Not on file   • Number of children: Not on file   • Years of education: Not on file   • Highest education level: Not on file   Occupational History   • Not on file   Tobacco Use   • Smoking status: Never Smoker   • Smokeless tobacco: Never Used   Vaping Use   • Vaping Use: Never used   Substance and Sexual Activity   • Alcohol use: Not Currently     Comment: occ   • Drug use: Never   • Sexual activity: Yes     Partners: Male      "Birth control/protection: I.U.D.     Comment: paragard   Other Topics Concern   • Not on file   Social History Narrative   • Not on file     Social Determinants of Health     Financial Resource Strain: Not on file   Food Insecurity: Not on file   Transportation Needs: Not on file   Physical Activity: Not on file   Stress: Not on file   Social Connections: Not on file   Intimate Partner Violence: Not on file   Housing Stability: Not on file       There are no problems to display for this patient.        Current Outpatient Medications   Medication Sig Dispense Refill   • PARAGARD INTRAUTERINE COPPER IU 1 Each by Intrauterine route one time. 6/2022     • ibuprofen (MOTRIN) 600 MG Tab Take 1 Tab by mouth every 6 hours as needed (For cramping after delivery; do not give if patient is receiving ketorolac (Toradol)). 30 Tab 60     No current facility-administered medications for this visit.     No Known Allergies    Review of Systems   Constitutional: Negative for fever, chills and malaise/fatigue.   HENT: Negative for congestion.    Eyes: Negative for pain.   Respiratory: Negative for cough and shortness of breath.    Cardiovascular: Negative for leg swelling.   Gastrointestinal: Negative for nausea, vomiting, abdominal pain and diarrhea.   Genitourinary: Negative for dysuria and hematuria.   Skin: Negative for rash.   Neurological: Negative for dizziness, focal weakness and headaches.   Endo/Heme/Allergies: Does not bruise/bleed easily.   Psychiatric/Behavioral: Negative for depression.  The patient is not nervous/anxious.      Objective:     /62 (BP Location: Left arm, Patient Position: Sitting, BP Cuff Size: Adult)   Pulse 88   Temp 36.9 °C (98.5 °F) (Temporal)   Ht 1.651 m (5' 5\")   Wt 81.6 kg (180 lb)   SpO2 97%   BMI 29.95 kg/m²   Body mass index is 29.95 kg/m².  Wt Readings from Last 4 Encounters:   07/26/22 81.6 kg (180 lb)   04/19/20 81.2 kg (179 lb)   10/17/19 72.1 kg (159 lb)       Physical " Exam:  Constitutional: Well-developed and well-nourished. Not diaphoretic. No distress.   Skin: Skin is warm and dry. No rash noted.  Head: Atraumatic without lesions.  Eyes: Conjunctivae and extraocular motions are normal. Pupils are equal, round, and reactive to light. No scleral icterus.   Ears:  External ears unremarkable. Tympanic membranes clear and intact.  Nose: Nares patent. Septum midline. Turbinates without erythema nor edema. No discharge.   Mouth/Throat: Dentition is WNL. Tongue normal. Oropharynx is clear and moist. Posterior pharynx without erythema or exudates.  Neck: Supple, trachea midline. Normal range of motion. No thyromegaly present. No lymphadenopathy--cervical or supraclavicular.  Cardiovascular: Regular rate and rhythm, S1 and S2 without murmur, rubs, or gallops.  Lungs: Normal inspiratory effort, CTA bilaterally, no wheezes/rhonchi/rales  Abdomen: Soft, non tender, and without distention. Active bowel sounds in all four quadrants. No rebound, guarding, masses or HSM.  Extremities: No cyanosis, clubbing, erythema, nor edema. Distal pulses intact and symmetric.   Musculoskeletal: All major joints AROM full in all directions without pain.  Neurological: Alert and oriented x 3. DTRs 2+/3 and symmetric. No cranial nerve deficit. 5/5 myotomes. Sensation intact. Negative Rhomberg.  Psychiatric:  Behavior, mood, and affect are appropriate.      Assessment and Plan:     1. Need for vaccination  Tdap Vaccine =>8YO IM   2. Wellness examination  Comp Metabolic Panel (fasting)    Lipid Profile       HCM:  Update labs.   Labs per orders  Immunizations per orders  Patient counseled about skin care, diet, supplements, prenatal vitamins, safe sex and exercise.    Follow-up: Return in about 1 year (around 7/26/2023), or if symptoms worsen or fail to improve.

## 2022-07-26 NOTE — LETTER
[facility]  BIRDIE MarkP.RKHADIJAH  [address]  [place] [address]  Fax: [phone]   Authorization for Release/Disclosure of   Protected Health Information   Name: SUSANNE RED : 1985 SSN: [id number]   Address: [facility] [place] [address] Phone:    [phone] (home)    I authorize the entity listed below to release/disclose the PHI below to:   [facility]/Fredi Espinosa A.P.R.JIMMY. and CALVIN Mark.P.RNY.   Provider or Entity Name:  Dr. Salazar   Address   City, [facility]   Phone:      Fax:     Reason for request: continuity of care   Information to be released:    [  ] LAST COLONOSCOPY,  including any PATH REPORT and follow-up  [  ] LAST FIT/COLOGUARD RESULT [  ] LAST DEXA  [  ] LAST MAMMOGRAM  [X] LAST PAP  [  ] LAST LABS [  ] RETINA EXAM REPORT  [  ] IMMUNIZATION RECORDS  [  ] Release all info      [  ] Check here and initial the line next to each item to release ALL health information INCLUDING  _____ Care and treatment for drug and / or alcohol abuse  _____ HIV testing, infection status, or AIDS  _____ Genetic Testing    DATES OF SERVICE OR TIME PERIOD TO BE DISCLOSED: _____________  I understand and acknowledge that:  * This Authorization may be revoked at any time by you in writing, except if your health information has already been used or disclosed.  * Your health information that will be used or disclosed as a result of you signing this authorization could be re-disclosed by the recipient. If this occurs, your re-disclosed health information may no longer be protected by State or Federal laws.  * You may refuse to sign this Authorization. Your refusal will not affect your ability to obtain treatment.  * This Authorization becomes effective upon signing and will  on (date) __________.      If no date is indicated, this Authorization will  one (1) year from the signature date.    Name: Susanne Mendoza  TEOFILO    Signature:   Date:     7/26/2022       PLEASE FAX REQUESTED RECORDS BACK TO: [phone]

## 2023-03-14 NOTE — CONSULTS
Lactation note 11:00) Baby 38.6 weeks, , Mother plans to breast & bottle feed baby. Reinforced with mother to always breastfeed first when baby cueing then supplement with formula using guideline 10-20-30 volumes, supplemental guidelines sheet given. Baby cueing asked mother if I could help baby latch, she agreed. Assisted baby to left breast using cross cradle hold, baby on & off, then settled into a deep latch with coordinated sucks & swallows heard- see latch assessment score.      Teaching on hunger cues, breastfeeding when baby shows cues or by 3-4 hours from last feed, may supplement with formula after breastfeeding using guidelines,  importance of skin to skin, positioning baby at breast & cluster feeding.      Contact information for OP lactation given.     Breastfeeding plan:  Breastfeed first then supplement with formula using guideline volumes. F/U with OP lactation for breastfeeding support.   
Lactation note:  Initial visit. Discussed normal  feeding behaviors and normal course of breastfeeding at 12-24-48-72 hours, and what to expect. Discussed importance of offering breast with feeding cues or at least every 2-3 hours. Mother's plan is to do both breast and bottle, she was only able to breast feed her first for a month.   Discussed indicators of a deep latch, voiding and stooling patterns, feeding cues, stomach size, and importance of establishing milk supply with frequency of feedings.    Plan for tonight is to continue to offer breast first, then can supplement with formula afterwards.     Information and phone numbers to the [facility] provided& invited to breastfeeding circles via Zoom format.    MOB has no other questions or concerns regarding breastfeeding. Encouraged to call for assistance as needed.  
show

## 2023-09-03 ENCOUNTER — HOSPITAL ENCOUNTER (EMERGENCY)
Facility: MEDICAL CENTER | Age: 38
End: 2023-09-03
Attending: EMERGENCY MEDICINE
Payer: COMMERCIAL

## 2023-09-03 ENCOUNTER — APPOINTMENT (OUTPATIENT)
Dept: RADIOLOGY | Facility: MEDICAL CENTER | Age: 38
End: 2023-09-03
Attending: EMERGENCY MEDICINE
Payer: COMMERCIAL

## 2023-09-03 ENCOUNTER — OFFICE VISIT (OUTPATIENT)
Dept: URGENT CARE | Facility: CLINIC | Age: 38
End: 2023-09-03
Payer: COMMERCIAL

## 2023-09-03 VITALS
SYSTOLIC BLOOD PRESSURE: 104 MMHG | DIASTOLIC BLOOD PRESSURE: 70 MMHG | RESPIRATION RATE: 16 BRPM | TEMPERATURE: 98.1 F | OXYGEN SATURATION: 98 % | HEART RATE: 78 BPM

## 2023-09-03 VITALS
HEART RATE: 112 BPM | BODY MASS INDEX: 32.67 KG/M2 | OXYGEN SATURATION: 99 % | WEIGHT: 184.4 LBS | RESPIRATION RATE: 12 BRPM | DIASTOLIC BLOOD PRESSURE: 84 MMHG | TEMPERATURE: 97.3 F | HEIGHT: 63 IN | SYSTOLIC BLOOD PRESSURE: 118 MMHG

## 2023-09-03 DIAGNOSIS — M66.0 BAKER'S CYST, RUPTURED: ICD-10-CM

## 2023-09-03 DIAGNOSIS — R00.0 TACHYCARDIA: ICD-10-CM

## 2023-09-03 DIAGNOSIS — M79.89 SWELLING OF CALF: ICD-10-CM

## 2023-09-03 LAB
ANION GAP SERPL CALC-SCNC: 12 MMOL/L (ref 7–16)
APTT PPP: 28.9 SEC (ref 24.7–36)
BASOPHILS # BLD AUTO: 0.5 % (ref 0–1.8)
BASOPHILS # BLD: 0.05 K/UL (ref 0–0.12)
BUN SERPL-MCNC: 12 MG/DL (ref 8–22)
CALCIUM SERPL-MCNC: 9.6 MG/DL (ref 8.4–10.2)
CHLORIDE SERPL-SCNC: 100 MMOL/L (ref 96–112)
CK SERPL-CCNC: 101 U/L (ref 0–154)
CK SERPL-CCNC: 89 U/L (ref 0–154)
CO2 SERPL-SCNC: 24 MMOL/L (ref 20–33)
CREAT SERPL-MCNC: 0.97 MG/DL (ref 0.5–1.4)
EOSINOPHIL # BLD AUTO: 0.02 K/UL (ref 0–0.51)
EOSINOPHIL NFR BLD: 0.2 % (ref 0–6.9)
ERYTHROCYTE [DISTWIDTH] IN BLOOD BY AUTOMATED COUNT: 40.5 FL (ref 35.9–50)
GFR SERPLBLD CREATININE-BSD FMLA CKD-EPI: 76 ML/MIN/1.73 M 2
GLUCOSE SERPL-MCNC: 117 MG/DL (ref 65–99)
HCT VFR BLD AUTO: 39.9 % (ref 37–47)
HGB BLD-MCNC: 13.3 G/DL (ref 12–16)
IMM GRANULOCYTES # BLD AUTO: 0.03 K/UL (ref 0–0.11)
IMM GRANULOCYTES NFR BLD AUTO: 0.3 % (ref 0–0.9)
INR PPP: 1 (ref 0.87–1.13)
LYMPHOCYTES # BLD AUTO: 2.9 K/UL (ref 1–4.8)
LYMPHOCYTES NFR BLD: 27 % (ref 22–41)
MCH RBC QN AUTO: 28.9 PG (ref 27–33)
MCHC RBC AUTO-ENTMCNC: 33.3 G/DL (ref 32.2–35.5)
MCV RBC AUTO: 86.6 FL (ref 81.4–97.8)
MONOCYTES # BLD AUTO: 0.98 K/UL (ref 0–0.85)
MONOCYTES NFR BLD AUTO: 9.1 % (ref 0–13.4)
NEUTROPHILS # BLD AUTO: 6.78 K/UL (ref 1.82–7.42)
NEUTROPHILS NFR BLD: 62.9 % (ref 44–72)
NRBC # BLD AUTO: 0 K/UL
NRBC BLD-RTO: 0 /100 WBC (ref 0–0.2)
PLATELET # BLD AUTO: 226 K/UL (ref 164–446)
PMV BLD AUTO: 11.2 FL (ref 9–12.9)
POTASSIUM SERPL-SCNC: 4.1 MMOL/L (ref 3.6–5.5)
PROTHROMBIN TIME: 13.7 SEC (ref 12–14.6)
RBC # BLD AUTO: 4.61 M/UL (ref 4.2–5.4)
SODIUM SERPL-SCNC: 136 MMOL/L (ref 135–145)
WBC # BLD AUTO: 10.8 K/UL (ref 4.8–10.8)

## 2023-09-03 PROCEDURE — 73701 CT LOWER EXTREMITY W/DYE: CPT | Mod: LT

## 2023-09-03 PROCEDURE — 700102 HCHG RX REV CODE 250 W/ 637 OVERRIDE(OP): Performed by: EMERGENCY MEDICINE

## 2023-09-03 PROCEDURE — 82550 ASSAY OF CK (CPK): CPT

## 2023-09-03 PROCEDURE — 99214 OFFICE O/P EST MOD 30 MIN: CPT | Performed by: PHYSICIAN ASSISTANT

## 2023-09-03 PROCEDURE — 85610 PROTHROMBIN TIME: CPT

## 2023-09-03 PROCEDURE — 85730 THROMBOPLASTIN TIME PARTIAL: CPT

## 2023-09-03 PROCEDURE — 80048 BASIC METABOLIC PNL TOTAL CA: CPT

## 2023-09-03 PROCEDURE — 85025 COMPLETE CBC W/AUTO DIFF WBC: CPT

## 2023-09-03 PROCEDURE — 99284 EMERGENCY DEPT VISIT MOD MDM: CPT

## 2023-09-03 PROCEDURE — 36415 COLL VENOUS BLD VENIPUNCTURE: CPT

## 2023-09-03 PROCEDURE — 700117 HCHG RX CONTRAST REV CODE 255: Performed by: EMERGENCY MEDICINE

## 2023-09-03 PROCEDURE — 3074F SYST BP LT 130 MM HG: CPT | Performed by: PHYSICIAN ASSISTANT

## 2023-09-03 PROCEDURE — 93971 EXTREMITY STUDY: CPT | Mod: LT

## 2023-09-03 PROCEDURE — A9270 NON-COVERED ITEM OR SERVICE: HCPCS | Performed by: EMERGENCY MEDICINE

## 2023-09-03 PROCEDURE — 3079F DIAST BP 80-89 MM HG: CPT | Performed by: PHYSICIAN ASSISTANT

## 2023-09-03 RX ORDER — NAPROXEN 250 MG/1
500 TABLET ORAL ONCE
Status: COMPLETED | OUTPATIENT
Start: 2023-09-03 | End: 2023-09-03

## 2023-09-03 RX ORDER — NAPROXEN 500 MG/1
500 TABLET ORAL 2 TIMES DAILY WITH MEALS
Qty: 28 TABLET | Refills: 0 | Status: SHIPPED | OUTPATIENT
Start: 2023-09-03 | End: 2023-09-17

## 2023-09-03 RX ORDER — LEVOTHYROXINE SODIUM 0.07 MG/1
TABLET ORAL
COMMUNITY
Start: 2023-08-18

## 2023-09-03 RX ORDER — ERGOCALCIFEROL 1.25 MG/1
CAPSULE ORAL
COMMUNITY

## 2023-09-03 RX ADMIN — IOHEXOL 100 ML: 350 INJECTION, SOLUTION INTRAVENOUS at 21:43

## 2023-09-03 RX ADMIN — NAPROXEN 500 MG: 250 TABLET ORAL at 22:45

## 2023-09-04 NOTE — PROGRESS NOTES
"Subjective:   Pura Bland is a 37 y.o. female who presents for Leg Swelling (X2 weeks Left leg, \" Back of leg, feels hard, and a cramping feeling.\" )  This is a pleasant 37-year-old female who presents with chief complaint of left calf pain and swelling.  She points to the medial calf.  She states this has been intermittent over the past 2 weeks but swelling became acutely worse yesterday prompting evaluation.  She did roll her ankle a couple of weeks ago but states she had no calf related issues with this and her ankle pain has completely resolved.  She has no history of recent prolonged travel, immobilization, cancer, surgery.  She does not use any hormonal therapies.  She has a ParaGard IUD in place.  She denies shortness of breath or hemoptysis.  No history of DVT or PE.  She has no known clotting disorders.      Medications:  ibuprofen Tabs  levothyroxine Tabs  PARAGARD INTRAUTERINE COPPER IU  vitamin D2 (Ergocalciferol) Caps    Allergies:             Patient has no known allergies.    Surgical History:         Past Surgical History:   Procedure Laterality Date    LUMPECTOMY      LUMPECTOMY         Past Social Hx:  Pura Bland  reports that she has never smoked. She has never used smokeless tobacco. She reports that she does not currently use alcohol. She reports that she does not use drugs.     Past Family Hx:   Pura Bland family history includes Diabetes in her father; Hypertension in her mother.       Problem list, medications, and allergies reviewed by myself today in Epic.     Objective:     /84 (BP Location: Right arm, Patient Position: Sitting, BP Cuff Size: Adult)   Pulse (!) 112   Temp 36.3 °C (97.3 °F) (Temporal)   Resp 12   Ht 1.6 m (5' 3\")   Wt 83.6 kg (184 lb 6.4 oz)   LMP 08/23/2023 (Approximate)   SpO2 99%   Breastfeeding No   BMI 32.66 kg/m²     Physical Exam  Vitals and nursing note reviewed.   Constitutional:       " General: She is not in acute distress.     Appearance: Normal appearance. She is not ill-appearing, toxic-appearing or diaphoretic.   HENT:      Head: Normocephalic.      Right Ear: External ear normal.      Left Ear: External ear normal.      Nose: Nose normal.      Mouth/Throat:      Mouth: Mucous membranes are moist.   Eyes:      Extraocular Movements: Extraocular movements intact.      Conjunctiva/sclera: Conjunctivae normal.      Pupils: Pupils are equal, round, and reactive to light.   Cardiovascular:      Rate and Rhythm: Normal rate.      Pulses: Normal pulses.   Pulmonary:      Effort: Pulmonary effort is normal. No respiratory distress.   Musculoskeletal:      Cervical back: Normal range of motion.        Legs:       Comments: There is obvious discrepancy in calf size of at least 2 cm.  Varicosities are noted to the left leg.  There is tenderness palpation along the medial gastroc.  Distal pulses are intact.  No overlying skin changes, erythema, or signs of infection.  Ankle range of motion is full.  Knee range of motion is full.   Skin:     General: Skin is warm.      Findings: No lesion or rash.   Neurological:      General: No focal deficit present.      Mental Status: She is alert and oriented to person, place, and time.   Psychiatric:         Thought Content: Thought content normal.         Judgment: Judgment normal.         Assessment/Plan:     Diagnosis and Associated Orders:     1. Tachycardia    2. Swelling of calf    Other orders  - levothyroxine (SYNTHROID) 75 MCG Tab  - vitamin D2, Ergocalciferol, (DRISDOL) 1.25 MG (81733 UT) Cap capsule; Take  by mouth every 7 days.        Comments/MDM:  Patient presents with tachycardia and significant calf swelling with signif good night icant tenderness to palpation along the medial surface.  No obvious trauma.  No overlying skin changes or evidence of cellulitis or infection.  I do feel this patient needs an ultrasound and unfortunately we are unable to  arrange 1 for her at this time of night.  I am recommending higher level of care.  [facility] contacted.  Patient will proceed via private vehicle with her .  I personally reviewed prior external notes and test results pertinent to today's visit. Supportive care, natural history, differential diagnoses, and indications for immediate follow-up discussed. Return to clinic or go to ED if symptoms worsen or persist.  Red flag symptoms discussed.  Patient/Parent/Guardian voices understanding. Follow-up with your primary care provider in 3-5 days.  All side effects of medication discussed including allergic response, GI upset, tendon injury, rash, sedation etc    Please note that this dictation was created using voice recognition software. I have made a reasonable attempt to correct obvious errors, but I expect that there are errors of grammar and possibly content that I did not discover before finalizing the note.    This note was electronically signed by Ene Berkowitz PA-C

## 2023-09-04 NOTE — ED NOTES
ERP at bedside. Pt agrees with plan of care discussed by ERP. AIDET acknowledged with patient. Evelio in low position, side rail up for pt safety. Call light within reach. Will continue to monitor.

## 2023-09-04 NOTE — ED PROVIDER NOTES
ER Provider Note    Scribed for James Mosqueda M.d. by Randall Newman. 9/3/2023  6:53 PM    Primary Care Provider: LELO Mark    CHIEF COMPLAINT  Chief Complaint   Patient presents with    Leg Pain     EXTERNAL RECORDS REVIEWED  Inpatient Notes Patent was admitted for child delivery on 4/19/20.    HPI/ROS  LIMITATION TO HISTORY   Select: : None    OUTSIDE HISTORIAN(S):  Family Family present at bedside.    Pura Bland is a 37 y.o. female who presents to the ED complaining of posterior left leg pain onset 1 month ago. The patient states she jumped and landed on her ankle a month ago. She noted that she twisted her ankle at that time. She reports that the ankle pain resolved recently, but began experiencing left leg pain. She also experiences left leg swelling, cough, and shortness of breath. Patient denies left thigh, ankle pain, or right leg pain. No alleviating or exacerbating factors reported.      PAST MEDICAL HISTORY  History reviewed. No pertinent past medical history.    SURGICAL HISTORY  Past Surgical History:   Procedure Laterality Date    LUMPECTOMY      LUMPECTOMY       FAMILY HISTORY  Family History   Problem Relation Age of Onset    Hypertension Mother     Diabetes Father         type2     SOCIAL HISTORY   reports that she has never smoked. She has never used smokeless tobacco. She reports that she does not currently use alcohol. She reports that she does not use drugs.    CURRENT MEDICATIONS  Previous Medications    IBUPROFEN (MOTRIN) 600 MG TAB    Take 1 Tab by mouth every 6 hours as needed (For cramping after delivery; do not give if patient is receiving ketorolac (Toradol)).    LEVOTHYROXINE (SYNTHROID) 75 MCG TAB        PARAGARD INTRAUTERINE COPPER IU    1 Each by Intrauterine route one time. 6/2022    VITAMIN D2, ERGOCALCIFEROL, (DRISDOL) 1.25 MG (72868 UT) CAP CAPSULE    Take  by mouth every 7 days.     ALLERGIES  Patient has no known  allergies.    PHYSICAL EXAM  VITAL SIGNS: BP (!) 143/94   Pulse (!) 106   Temp 36.7 °C (98 °F) (Temporal)   Resp 18   LMP 08/23/2023 (Approximate)   SpO2 98%   Pulse ox interpretation: I interpret this pulse ox as normal.  Constitutional: Alert in no apparent distress.  HENT: No signs of trauma, Bilateral external ears normal, Nose normal.   Eyes: Pupils are equal and reactive, Conjunctiva normal, Non-icteric.   Neck: Normal range of motion, Supple, No stridor.    Cardiovascular: Normal peripheral perfusion  Thorax & Lungs: Unlabored respirations, equal chest expansion, no accessory muscle use  Abdomen: Non-distended  Skin:  No erythema, No rash.   Back: Normal alignment and ROM  Extremities: Left lower extremities diffusely tense, tender, and swollen from knee to ankle, 2 or 3 small areas or red blotchy skin discolorations but no cellulitis or abscess. Strong DP pulse, No gross deformity  Musculoskeletal: Good range of motion in all major joints.   Neurologic: Alert, Normal motor function, No focal deficits noted.   Psychiatric: Affect normal, Judgment normal, Mood normal.     DIAGNOSTIC STUDIES    Labs:   Labs Reviewed   CBC WITH DIFFERENTIAL - Abnormal; Notable for the following components:       Result Value    Monos (Absolute) 0.98 (*)     All other components within normal limits    Narrative:     Indicate which anticoagulants the patient is on:->NONE   BASIC METABOLIC PANEL - Abnormal; Notable for the following components:    Glucose 117 (*)     All other components within normal limits    Narrative:     Indicate which anticoagulants the patient is on:->NONE   APTT    Narrative:     Indicate which anticoagulants the patient is on:->NONE   PROTHROMBIN TIME    Narrative:     Indicate which anticoagulants the patient is on:->NONE   CREATINE KINASE    Narrative:     Indicate which anticoagulants the patient is on:->NONE   ESTIMATED GFR    Narrative:     Indicate which anticoagulants the patient is on:->NONE    CREATINE KINASE   All labs reviewed by me.     Radiology:   The attending emergency physician has independently interpreted the diagnostic imaging associated with this visit and am waiting the final reading from the radiologist.   Preliminary interpretation is a follows:   Radiologist interpretation:   CT-EXTREMITY, LOWER WITH LEFT   Final Result         1. Small fluid area along the posterior medial knee with surrounding stranding, concerning for ruptured Baker's cyst.   2. A 1.9 cm high density lesion along the inferior most aspect of the Baker's cyst, likely a hematoma.      US-EXTREMITY VENOUS LOWER UNILAT LEFT   Final Result         1. No evidence of left lower extremity deep venous thrombosis.        COURSE & MEDICAL DECISION MAKING     ED Observation Status? Yes; I am placing the patient in to an observation status due to a diagnostic uncertainty as well as therapeutic intensity. Patient placed in observation status at 7:08 PM, 9/3/2023.     Observation plan is as follows: I will monitor the patient pending labs and imaging and treat patient symptoms if necessary.     Upon Reevaluation, the patient's condition has: Improved; and will be discharged.    Patient discharged from ED Observation status at 10:00 PM (Time) 9/3/23 (Date).     INITIAL ASSESSMENT, COURSE AND PLAN  Care Narrative:       6:53 PM - Patient presents to the ED with left leg pain. Per triage protocol, Creatine Kinase, BMP, PT/INR, PTT, CBC w/ diff, ad US-Extremity Venous lower unilateral left was ordered. Patient evaluated at bedside and discussed plan of care, including labs and imaging. Differential diagnoses include but not limited to: DVT, compartment syndrome, occult fracture.    8:02 PM - No DVT according to [facility], but they noticed a number of enlarged lymph nodes. Given grossly abnormal physical exam, pain, and lymphadenopathy, ordered contrast CT of affected leg.     8:50 PM - Patient was reevaluated at bedside. Discussed imaging  results with the patient. Informed patient of CT-Extremity lower w/ left and creatine kinase to further evaluate.     10:00 PM CT scan showed a ruptured Baker's cyst.  Per up-to-date, it is common to have calf pain and swelling once the Baker's cyst has ruptured.  There is a hematoma, but no sign of abscess.  This appears to be inflammatory change, not true infection.  Of asked the patient to keep a close eye on her symptoms, treat with NSAIDs as per standard care for Baker's cyst, and schedule follow-up with orthopedics.  I have placed an orthopedics referral.  Patient return here for worsening symptoms, or signs of infection such as increasing redness, pain, swelling, as opposed to gradual improvement.    ADDITIONAL PROBLEM LIST      DISPOSITION AND DISCUSSIONS    Discussion of management with other \[facility]\"" or appropriate source(s): Radiologist  [facility] informed me of negative DVT but found multiple enlarged lymph nodes.  I reviewed the CT results directly with Dr. Messina, discussing ruptured Baker's cyst.    Escalation of care considered, and ultimately not performed: acute inpatient care management, however at this time, the patient is most appropriate for outpatient management.  She can be treated supportively, given close return precautions, and followed up with orthopedics unless worsening, requiring ED reevaluation.    Decision tools and prescription drugs considered including, but not limited to: Antibiotics considered, but this appears at the moment to be an inflammatory rather than infectious process .     The patient will return for new or worsening symptoms and is stable at the time of discharge.    The patient is referred to a primary physician for blood pressure management, diabetic screening, and for all other preventative health concerns.      DISPOSITION:  Patient will be discharged home in stable condition.    FOLLOW UP:  Radhames Liao M.D.  [facility] [address] [place]  [id number]  [phone]    Schedule an appointment as soon as possible for a visit         OUTPATIENT MEDICATIONS:  Discharge Medication List as of 9/3/2023 10:37 PM        START taking these medications    Details   naproxen (NAPROSYN) 500 MG Tab Take 1 Tablet by mouth 2 times a day with meals for 14 days., Disp-28 Tablet, R-0, Normal               FINAL DIAGNOSIS  1. Baker's cyst, ruptured         IRandall (Scribe), am scribing for, and in the presence of, James Mosqueda M.D..    Electronically signed by: Randall Newman (Scribe), 9/3/2023    IJames M.D. personally performed the services described in this documentation, as scribed by Randall Newman in my presence, and it is both accurate and complete.

## 2023-09-04 NOTE — DISCHARGE INSTRUCTIONS
Crystal la información adjunta sobre los quistes de [place]. La causa de ramirez dolor e hinchazón es matthieu rotura del quiste de [place]. Cuando estos se rompen, suelen causar dolor e hinchazón en la pantorrilla. [place] generalmente mejora con matthieu semana o dos de medicación antiinflamatoria, que le hemos recetado. En ocasiones, requieren tratamiento adicional con ortopedia. Hemos realizado matthieu derivación para que usted consulte con un ortopedista y puede utilizar la información adjunta para llamar y programar matthieu kiara. Si se siente normal cuando llega la kiara, no es necesario que vaya al médico.    (Please read the attached information about Bakers cysts.  The cause of your pain and swelling is a ruptured Bakers cyst.  When these rupture, they often cause pain and swelling in the calf.  This usually gets better with a week or 2 of anti-inflammatory medication, which we have prescribed for you.  Sometimes, they require additional treatment with orthopedics.  We have placed a referral for you to see orthopedics, and you can use the attached information to call to schedule an appointment.  If you are feeling back to normal by the time the appointment comes around, you do not necessarily need to go see the doctor.)

## 2023-10-11 ENCOUNTER — HOSPITAL ENCOUNTER (OUTPATIENT)
Dept: LAB | Facility: MEDICAL CENTER | Age: 38
End: 2023-10-11
Attending: PHYSICIAN ASSISTANT
Payer: COMMERCIAL

## 2023-10-11 DIAGNOSIS — M65.322 TRIGGER FINGER OF ALL DIGITS OF BOTH HANDS: ICD-10-CM

## 2023-10-11 DIAGNOSIS — M65.351 TRIGGER FINGER OF ALL DIGITS OF BOTH HANDS: ICD-10-CM

## 2023-10-11 DIAGNOSIS — M25.50 POLYARTHRALGIA: ICD-10-CM

## 2023-10-11 DIAGNOSIS — M65.331 TRIGGER FINGER OF ALL DIGITS OF BOTH HANDS: ICD-10-CM

## 2023-10-11 DIAGNOSIS — M65.352 TRIGGER FINGER OF ALL DIGITS OF BOTH HANDS: ICD-10-CM

## 2023-10-11 DIAGNOSIS — M65.311 TRIGGER FINGER OF ALL DIGITS OF BOTH HANDS: ICD-10-CM

## 2023-10-11 DIAGNOSIS — M65.342 TRIGGER FINGER OF ALL DIGITS OF BOTH HANDS: ICD-10-CM

## 2023-10-11 DIAGNOSIS — M65.332 TRIGGER FINGER OF ALL DIGITS OF BOTH HANDS: ICD-10-CM

## 2023-10-11 DIAGNOSIS — M65.312 TRIGGER FINGER OF ALL DIGITS OF BOTH HANDS: ICD-10-CM

## 2023-10-11 DIAGNOSIS — M65.321 TRIGGER FINGER OF ALL DIGITS OF BOTH HANDS: ICD-10-CM

## 2023-10-11 DIAGNOSIS — M65.341 TRIGGER FINGER OF ALL DIGITS OF BOTH HANDS: ICD-10-CM

## 2023-10-11 LAB
BASOPHILS # BLD AUTO: 0.2 % (ref 0–1.8)
BASOPHILS # BLD: 0.04 K/UL (ref 0–0.12)
CRP SERPL HS-MCNC: 2.95 MG/DL (ref 0–0.75)
EOSINOPHIL # BLD AUTO: 0 K/UL (ref 0–0.51)
EOSINOPHIL NFR BLD: 0 % (ref 0–6.9)
ERYTHROCYTE [DISTWIDTH] IN BLOOD BY AUTOMATED COUNT: 41.2 FL (ref 35.9–50)
ERYTHROCYTE [SEDIMENTATION RATE] IN BLOOD BY WESTERGREN METHOD: 20 MM/HOUR (ref 0–25)
HCT VFR BLD AUTO: 37.4 % (ref 37–47)
HGB BLD-MCNC: 12.5 G/DL (ref 12–16)
IMM GRANULOCYTES # BLD AUTO: 0.08 K/UL (ref 0–0.11)
IMM GRANULOCYTES NFR BLD AUTO: 0.4 % (ref 0–0.9)
LYMPHOCYTES # BLD AUTO: 1.73 K/UL (ref 1–4.8)
LYMPHOCYTES NFR BLD: 9.4 % (ref 22–41)
MCH RBC QN AUTO: 28.5 PG (ref 27–33)
MCHC RBC AUTO-ENTMCNC: 33.4 G/DL (ref 32.2–35.5)
MCV RBC AUTO: 85.2 FL (ref 81.4–97.8)
MONOCYTES # BLD AUTO: 0.94 K/UL (ref 0–0.85)
MONOCYTES NFR BLD AUTO: 5.1 % (ref 0–13.4)
NEUTROPHILS # BLD AUTO: 15.62 K/UL (ref 1.82–7.42)
NEUTROPHILS NFR BLD: 84.9 % (ref 44–72)
NRBC # BLD AUTO: 0 K/UL
NRBC BLD-RTO: 0 /100 WBC (ref 0–0.2)
PLATELET # BLD AUTO: 296 K/UL (ref 164–446)
PMV BLD AUTO: 11.4 FL (ref 9–12.9)
RBC # BLD AUTO: 4.39 M/UL (ref 4.2–5.4)
RHEUMATOID FACT SER IA-ACNC: <10 IU/ML (ref 0–14)
WBC # BLD AUTO: 18.4 K/UL (ref 4.8–10.8)

## 2023-10-11 PROCEDURE — 86200 CCP ANTIBODY: CPT

## 2023-10-11 PROCEDURE — 86038 ANTINUCLEAR ANTIBODIES: CPT

## 2023-10-11 PROCEDURE — 86431 RHEUMATOID FACTOR QUANT: CPT

## 2023-10-11 PROCEDURE — 85652 RBC SED RATE AUTOMATED: CPT

## 2023-10-11 PROCEDURE — 86140 C-REACTIVE PROTEIN: CPT

## 2023-10-11 PROCEDURE — 36415 COLL VENOUS BLD VENIPUNCTURE: CPT

## 2023-10-11 PROCEDURE — 85025 COMPLETE CBC W/AUTO DIFF WBC: CPT

## 2023-10-13 LAB
CCP IGA+IGG SERPL IA-ACNC: 4 UNITS (ref 0–19)
NUCLEAR IGG SER QL IA: NORMAL

## 2024-01-13 PROBLEM — G56.01 RIGHT CARPAL TUNNEL SYNDROME: Status: ACTIVE | Noted: 2024-01-11

## 2025-02-01 ENCOUNTER — HOSPITAL ENCOUNTER (OUTPATIENT)
Dept: LAB | Facility: MEDICAL CENTER | Age: 40
End: 2025-02-01
Attending: PHYSICIAN ASSISTANT
Payer: COMMERCIAL

## 2025-02-01 LAB
ALBUMIN SERPL BCP-MCNC: 4.6 G/DL (ref 3.2–4.9)
ALBUMIN/GLOB SERPL: 1.5 G/DL
ALP SERPL-CCNC: 105 U/L (ref 30–99)
ALT SERPL-CCNC: 18 U/L (ref 2–50)
ANION GAP SERPL CALC-SCNC: 13 MMOL/L (ref 7–16)
AST SERPL-CCNC: 21 U/L (ref 12–45)
BASOPHILS # BLD AUTO: 0.4 % (ref 0–1.8)
BASOPHILS # BLD: 0.04 K/UL (ref 0–0.12)
BILIRUB SERPL-MCNC: 0.8 MG/DL (ref 0.1–1.5)
BUN SERPL-MCNC: 8 MG/DL (ref 8–22)
CALCIUM ALBUM COR SERPL-MCNC: 8.8 MG/DL (ref 8.5–10.5)
CALCIUM SERPL-MCNC: 9.3 MG/DL (ref 8.5–10.5)
CHLORIDE SERPL-SCNC: 103 MMOL/L (ref 96–112)
CHOLEST SERPL-MCNC: 159 MG/DL (ref 100–199)
CO2 SERPL-SCNC: 22 MMOL/L (ref 20–33)
CREAT SERPL-MCNC: 0.8 MG/DL (ref 0.5–1.4)
EOSINOPHIL # BLD AUTO: 0.01 K/UL (ref 0–0.51)
EOSINOPHIL NFR BLD: 0.1 % (ref 0–6.9)
ERYTHROCYTE [DISTWIDTH] IN BLOOD BY AUTOMATED COUNT: 43.6 FL (ref 35.9–50)
EST. AVERAGE GLUCOSE BLD GHB EST-MCNC: 117 MG/DL
GFR SERPLBLD CREATININE-BSD FMLA CKD-EPI: 95 ML/MIN/1.73 M 2
GLOBULIN SER CALC-MCNC: 3 G/DL (ref 1.9–3.5)
GLUCOSE SERPL-MCNC: 89 MG/DL (ref 65–99)
HBA1C MFR BLD: 5.7 % (ref 4–5.6)
HCT VFR BLD AUTO: 40.6 % (ref 37–47)
HCV AB SER QL: NORMAL
HDLC SERPL-MCNC: 46 MG/DL
HGB BLD-MCNC: 13.4 G/DL (ref 12–16)
IMM GRANULOCYTES # BLD AUTO: 0.02 K/UL (ref 0–0.11)
IMM GRANULOCYTES NFR BLD AUTO: 0.2 % (ref 0–0.9)
LDLC SERPL CALC-MCNC: 90 MG/DL
LYMPHOCYTES # BLD AUTO: 1.99 K/UL (ref 1–4.8)
LYMPHOCYTES NFR BLD: 18.9 % (ref 22–41)
MCH RBC QN AUTO: 28.5 PG (ref 27–33)
MCHC RBC AUTO-ENTMCNC: 33 G/DL (ref 32.2–35.5)
MCV RBC AUTO: 86.2 FL (ref 81.4–97.8)
MONOCYTES # BLD AUTO: 1.12 K/UL (ref 0–0.85)
MONOCYTES NFR BLD AUTO: 10.6 % (ref 0–13.4)
NEUTROPHILS # BLD AUTO: 7.37 K/UL (ref 1.82–7.42)
NEUTROPHILS NFR BLD: 69.8 % (ref 44–72)
NRBC # BLD AUTO: 0 K/UL
NRBC BLD-RTO: 0 /100 WBC (ref 0–0.2)
PLATELET # BLD AUTO: 198 K/UL (ref 164–446)
PMV BLD AUTO: 12.1 FL (ref 9–12.9)
POTASSIUM SERPL-SCNC: 4.1 MMOL/L (ref 3.6–5.5)
PROT SERPL-MCNC: 7.6 G/DL (ref 6–8.2)
RBC # BLD AUTO: 4.71 M/UL (ref 4.2–5.4)
SODIUM SERPL-SCNC: 138 MMOL/L (ref 135–145)
TRIGL SERPL-MCNC: 114 MG/DL (ref 0–149)
TSH SERPL DL<=0.005 MIU/L-ACNC: 1.37 UIU/ML (ref 0.38–5.33)
WBC # BLD AUTO: 10.6 K/UL (ref 4.8–10.8)

## 2025-02-01 PROCEDURE — 83036 HEMOGLOBIN GLYCOSYLATED A1C: CPT

## 2025-02-01 PROCEDURE — 36415 COLL VENOUS BLD VENIPUNCTURE: CPT

## 2025-02-01 PROCEDURE — 86803 HEPATITIS C AB TEST: CPT

## 2025-02-01 PROCEDURE — 84443 ASSAY THYROID STIM HORMONE: CPT

## 2025-02-01 PROCEDURE — 85025 COMPLETE CBC W/AUTO DIFF WBC: CPT

## 2025-02-01 PROCEDURE — 80061 LIPID PANEL: CPT

## 2025-02-01 PROCEDURE — 80053 COMPREHEN METABOLIC PANEL: CPT

## 2025-04-15 ENCOUNTER — APPOINTMENT (OUTPATIENT)
Dept: OBGYN | Facility: CLINIC | Age: 40
End: 2025-04-15
Payer: COMMERCIAL

## 2025-04-15 VITALS
SYSTOLIC BLOOD PRESSURE: 113 MMHG | WEIGHT: 181.8 LBS | HEIGHT: 62 IN | BODY MASS INDEX: 33.45 KG/M2 | DIASTOLIC BLOOD PRESSURE: 64 MMHG

## 2025-04-15 DIAGNOSIS — Z30.09 CONSULTATION FOR FEMALE STERILIZATION: ICD-10-CM

## 2025-04-15 PROCEDURE — 3074F SYST BP LT 130 MM HG: CPT | Performed by: OBSTETRICS & GYNECOLOGY

## 2025-04-15 PROCEDURE — 99204 OFFICE O/P NEW MOD 45 MIN: CPT | Performed by: OBSTETRICS & GYNECOLOGY

## 2025-04-15 PROCEDURE — 3078F DIAST BP <80 MM HG: CPT | Performed by: OBSTETRICS & GYNECOLOGY

## 2025-04-15 ASSESSMENT — FIBROSIS 4 INDEX: FIB4 SCORE: 1.02

## 2025-04-15 NOTE — PROGRESS NOTES
New Gynecological Visit    Pura Fontenot Baldo    39 y.o.    Chief complaint    Chief Complaint   Patient presents with    New Patient     Consul for sterilization        HPI    38 yo  who presents for consultation for sterilization. She is using condoms for contraception. Does not want to use any other contraceptives or hormones. Is done with childbearing completely. Has regular menstrual cycles each month. Denies any gyn concerns.     Review of Systems:  Review of Systems   All other systems reviewed and are negative.       Past Obstetrical History:     x 2    Past Gynecological History:    Last pap: 2025 normal  H/o abnormal pap: No  H/o STIs: No  DEXA: N/A  Last Mammogram:  Never  LMP: Patient's last menstrual period was 2025 (approximate).  BCM: condoms    Past Medical History    Past Medical History:   Diagnosis Date    Thyroid disease        Past Surgical History    Past Surgical History:   Procedure Laterality Date    PB WRIST ARTHROSCOP,RELEASE XVERS LIG Right 2024    Procedure: RIGHT HAND ENDOSCOPIC CARPAL TUNNEL RELEASE;  Surgeon: Mckinley Reyes M.D.;  Location: [facility];  Service: Orthopedics    LUMPECTOMY Left 2003    LUMPECTOMY         Family History   Problem Relation Age of Onset    Hypertension Mother     Diabetes Father         type2       Allergies    No Known Allergies    Medications    Current Outpatient Medications   Medication Sig Dispense Refill    levothyroxine (SYNTHROID) 75 MCG Tab       vitamin D2, Ergocalciferol, (DRISDOL) 1.25 MG (78198 UT) Cap capsule Take  by mouth every 7 days.      famotidine (PEPCID) 20 MG Tab Take 20 mg by mouth every morning. (Patient not taking: Reported on 4/15/2025)       No current facility-administered medications for this visit.       Social  Social History     Tobacco Use    Smoking status: Never    Smokeless tobacco: Never   Vaping Use    Vaping status: Never Used   Substance Use Topics     "Alcohol use: Not Currently     Comment: occ    Drug use: Never        OBJECTIVE:    Vitals    /64 (BP Location: Right arm, Patient Position: Sitting, BP Cuff Size: Large adult long)   Ht 5' 2\"   Wt 181 lb 12.8 oz   LMP 2025 (Approximate)   BMI 33.25 kg/m²     Physical Exam    GENERAL: Well developed, well nourished, female in no acute distress.    Exam deferred    Labs/Pathology:    none    A/P    Patient Active Problem List   Diagnosis    Right carpal tunnel syndrome       Pura Zamudioian Bland    39 y.o.  desiring surgical sterilization. Declines other forms of contraception. Understands sterilization is permanent and irreversible. Federal sterilization consent signed today. Recommended laparoscopic bilateral salpingectomy to reduce risks of future tubal disease/malignancy.     1. Consultation for female sterilization      Procedure and recovery briefly discussed.   Requesting records for pap smear done at outside office.   Desires to continue to use condoms for contraception prior to surgery.   Request for surgery submitted.     RTC for preop.       Time spent: 30 minutes        Karrie Sewell M.D.    Obstetrics and Gynecology    4/15/88494:16 PM  "

## 2025-04-15 NOTE — Clinical Note
Plan: Laparoscopic bilateral salpingectomy and any other indicated procedures.  Assist: Yes Duration: 1 hour Outpatient Will need preop with me

## 2025-04-15 NOTE — PROGRESS NOTES
Patient here for GYN visit - NP, sterilization consult  LMP : 4/4/25 approx   BCM : None   Pap : No Hx of abnormal paps, had one Jan 2025 ([facility])    Mammo : Jan 2025 ([facility], went to [facility])   Phone/Pharmacy verified: [phone]    Will get FORREST for LEIGHA for recent GYN Hx.   Pt states no concerns at this time, would like to get BTL done when she can.

## 2025-05-22 ENCOUNTER — GYNECOLOGY VISIT (OUTPATIENT)
Dept: OBGYN | Facility: CLINIC | Age: 40
End: 2025-05-22
Payer: COMMERCIAL

## 2025-05-22 ENCOUNTER — TELEPHONE (OUTPATIENT)
Dept: OBGYN | Facility: CLINIC | Age: 40
End: 2025-05-22

## 2025-05-22 ENCOUNTER — APPOINTMENT (OUTPATIENT)
Dept: ADMISSIONS | Facility: MEDICAL CENTER | Age: 40
End: 2025-05-22
Attending: OBSTETRICS & GYNECOLOGY
Payer: COMMERCIAL

## 2025-05-22 VITALS
HEIGHT: 62 IN | BODY MASS INDEX: 32.87 KG/M2 | DIASTOLIC BLOOD PRESSURE: 81 MMHG | WEIGHT: 178.6 LBS | SYSTOLIC BLOOD PRESSURE: 117 MMHG

## 2025-05-22 DIAGNOSIS — Z01.818 PREOPERATIVE EXAM FOR GYNECOLOGIC SURGERY: Primary | ICD-10-CM

## 2025-05-22 DIAGNOSIS — Z30.09 CONSULTATION FOR FEMALE STERILIZATION: ICD-10-CM

## 2025-05-22 ASSESSMENT — FIBROSIS 4 INDEX: FIB4 SCORE: 1.02

## 2025-05-22 NOTE — PROGRESS NOTES
Type of operation: Laparoscopic bilateral salpingectomy  Date/time: 6.10.25 @ 10am with 8am check-in time  Location: [facility]  Pt notified of this.    Phone/pharm verified: [phone]

## 2025-05-22 NOTE — TELEPHONE ENCOUNTER
VOICEMAIL  1. Caller Name: Marco A Donohue (HR for patient's work)                          Call Back Number: [phone]    2. Message: calling from [employer]  in regards to patient's disability leave request and needing fax number once form is complete to send off. Please call us back at [phone] option 2 to ask for me (Sal/Garima) or have the person on the phone relay the message, thank you!    If Marco A calls back please ask for fax number to send paperwork for patient. Routed to SANAM Mendes due to last initial for FMLA.

## 2025-05-22 NOTE — PROGRESS NOTES
"History and Physical    Pura Bland    39 y.o.    Chief complaint    No chief complaint on file.      HPI    Patient is a 40 yo  who presents for preoperative visit for surgical sterilization.     She is using condoms for contraception. Does not want to use any other contraceptives or hormones. Is done with childbearing completely. Has regular menstrual cycles each month. Denies any gyn concerns.   Recommended laparoscopic bilateral salpingectomy to reduce risks of future tubal disease/malignancy.     Risks of surgery discussed with patient, including, but not limited to, bleeding, infection, damage to other organs such as bowel, bladder, ureters, and nerves, need for reoperation, need for blood transfusion if indicated. Patient understands all risks and all questions answered. Consents to be signed.       Review of Systems:  Review of Systems   All other systems reviewed and are negative.       Past Obstetrical History:     x 2    Past Gynecological History:    Last pap: 25 NILM  H/o abnormal pap: No  H/o STIs: No  DEXA: N/A  Last Mammogram: 25   LMP: Patient's last menstrual period was 2025 (within days).  BCM: Condoms    Past Medical History    Past Medical History[1]    Past Surgical History    Past Surgical History[2]    Family History   Problem Relation Age of Onset    Hypertension Mother     Diabetes Father         type2       Allergies    Allergies[3]    Medications    Current Medications[4]    Social  Social History[5]     OBJECTIVE:    Vitals    /81 (BP Location: Left arm, Patient Position: Sitting, BP Cuff Size: Large adult)   Ht 5' 2\"   Wt 178 lb 9.6 oz   LMP 2025 (Within Days)   BMI 32.67 kg/m²     Physical Exam    GENERAL: Well developed, well nourished, female in no acute distress.    HEENT: NCAT, mucus membranes moist    Neck: Supple, nontender, no PATY, no thyromegaly    CV: RRR    Pulm: CTAB    Abdomen: Soft ND NT.    Ext: WILL    : " Deferred to OR    Labs/Pathology:    Pending    A/P    Problem List[6]    Pura Bland    39 y.o.  desiring surgical sterilization.  Understands surgical sterilization is permanent and irreversible; she desires this.     1. Preoperative exam for gynecologic surgery    2. Consultation for female sterilization      Risks, benefits and recovery discussed in detail with patient.   All questions and concerns addressed.   Consent to be signed.   Preop labs ordered.   NPO 8h prior to OR.   Avoid NSAIDS x 7 days prior.   Preop antibiotics - none indicated.   Notify anesthesia.     To OR for laparoscopic bilateral salpingectomy.     Time spent: 30 minutes        Karrie Sewell M.D.    Obstetrics and Gynecology    1:22 AM         [1]   Past Medical History:  Diagnosis Date    Thyroid disease    [2]   Past Surgical History:  Procedure Laterality Date    PB WRIST ARTHROSCOP,RELEASE XVERS LIG Right 2024    Procedure: RIGHT HAND ENDOSCOPIC CARPAL TUNNEL RELEASE;  Surgeon: Mckinley Reyes M.D.;  Location: [facility];  Service: Orthopedics    LUMPECTOMY Left 2003    LUMPECTOMY     [3] No Known Allergies  [4]   Current Outpatient Medications   Medication Sig Dispense Refill    levothyroxine (SYNTHROID) 75 MCG Tab       vitamin D2, Ergocalciferol, (DRISDOL) 1.25 MG (20574 UT) Cap capsule Take  by mouth every 7 days.      famotidine (PEPCID) 20 MG Tab Take 20 mg by mouth every morning. (Patient not taking: Reported on 2025)       No current facility-administered medications for this visit.   [5]   Social History  Tobacco Use    Smoking status: Never    Smokeless tobacco: Never   Vaping Use    Vaping status: Never Used   Substance Use Topics    Alcohol use: Not Currently     Comment: occ    Drug use: Never   [6]   Patient Active Problem List  Diagnosis    Right carpal tunnel syndrome

## 2025-05-30 ENCOUNTER — PRE-ADMISSION TESTING (OUTPATIENT)
Dept: ADMISSIONS | Facility: MEDICAL CENTER | Age: 40
End: 2025-05-30
Attending: OBSTETRICS & GYNECOLOGY
Payer: COMMERCIAL

## 2025-05-30 NOTE — PREADMIT AVS NOTE
Current Medications   Medication Instructions    levothyroxine (SYNTHROID) 75 MCG Tab Continue taking medication as prescribed, including morning of procedure     vitamin D2, Ergocalciferol, (DRISDOL) 1.25 MG (84864 UT) Cap capsule Stop 7 days before surgery

## 2025-06-03 ENCOUNTER — PRE-ADMISSION TESTING (OUTPATIENT)
Dept: ADMISSIONS | Facility: MEDICAL CENTER | Age: 40
End: 2025-06-03
Attending: OBSTETRICS & GYNECOLOGY
Payer: COMMERCIAL

## 2025-06-03 DIAGNOSIS — Z01.812 PRE-OPERATIVE LABORATORY EXAMINATION: Primary | ICD-10-CM

## 2025-06-03 LAB
ABO GROUP BLD: NORMAL
ANION GAP SERPL CALC-SCNC: 11 MMOL/L (ref 7–16)
APPEARANCE UR: CLEAR
BASOPHILS # BLD AUTO: 0.7 % (ref 0–1.8)
BASOPHILS # BLD: 0.06 K/UL (ref 0–0.12)
BILIRUB UR QL STRIP.AUTO: NEGATIVE
BLD GP AB SCN SERPL QL: NORMAL
BUN SERPL-MCNC: 10 MG/DL (ref 8–22)
CALCIUM SERPL-MCNC: 9.3 MG/DL (ref 8.5–10.5)
CHLORIDE SERPL-SCNC: 105 MMOL/L (ref 96–112)
CO2 SERPL-SCNC: 22 MMOL/L (ref 20–33)
COLOR UR: YELLOW
CREAT SERPL-MCNC: 1.03 MG/DL (ref 0.5–1.4)
EOSINOPHIL # BLD AUTO: 0.03 K/UL (ref 0–0.51)
EOSINOPHIL NFR BLD: 0.4 % (ref 0–6.9)
ERYTHROCYTE [DISTWIDTH] IN BLOOD BY AUTOMATED COUNT: 42.6 FL (ref 35.9–50)
GFR SERPLBLD CREATININE-BSD FMLA CKD-EPI: 70 ML/MIN/1.73 M 2
GLUCOSE SERPL-MCNC: 99 MG/DL (ref 65–99)
GLUCOSE UR STRIP.AUTO-MCNC: NEGATIVE MG/DL
HCG UR QL: NEGATIVE
HCT VFR BLD AUTO: 38.4 % (ref 37–47)
HGB BLD-MCNC: 12.4 G/DL (ref 12–16)
IMM GRANULOCYTES # BLD AUTO: 0.04 K/UL (ref 0–0.11)
IMM GRANULOCYTES NFR BLD AUTO: 0.5 % (ref 0–0.9)
KETONES UR STRIP.AUTO-MCNC: NEGATIVE MG/DL
LEUKOCYTE ESTERASE UR QL STRIP.AUTO: NEGATIVE
LYMPHOCYTES # BLD AUTO: 3.11 K/UL (ref 1–4.8)
LYMPHOCYTES NFR BLD: 38.6 % (ref 22–41)
MCH RBC QN AUTO: 27.8 PG (ref 27–33)
MCHC RBC AUTO-ENTMCNC: 32.3 G/DL (ref 32.2–35.5)
MCV RBC AUTO: 86.1 FL (ref 81.4–97.8)
MICRO URNS: NORMAL
MONOCYTES # BLD AUTO: 0.52 K/UL (ref 0–0.85)
MONOCYTES NFR BLD AUTO: 6.5 % (ref 0–13.4)
NEUTROPHILS # BLD AUTO: 4.29 K/UL (ref 1.82–7.42)
NEUTROPHILS NFR BLD: 53.3 % (ref 44–72)
NITRITE UR QL STRIP.AUTO: NEGATIVE
NRBC # BLD AUTO: 0 K/UL
NRBC BLD-RTO: 0 /100 WBC (ref 0–0.2)
PH UR STRIP.AUTO: 5 [PH] (ref 5–8)
PLATELET # BLD AUTO: 198 K/UL (ref 164–446)
PMV BLD AUTO: 11.9 FL (ref 9–12.9)
POTASSIUM SERPL-SCNC: 4.1 MMOL/L (ref 3.6–5.5)
PROT UR QL STRIP: NEGATIVE MG/DL
RBC # BLD AUTO: 4.46 M/UL (ref 4.2–5.4)
RBC UR QL AUTO: NEGATIVE
RH BLD: NORMAL
SODIUM SERPL-SCNC: 138 MMOL/L (ref 135–145)
SP GR UR STRIP.AUTO: 1.01
UROBILINOGEN UR STRIP.AUTO-MCNC: 0.2 EU/DL
WBC # BLD AUTO: 8.1 K/UL (ref 4.8–10.8)

## 2025-06-03 PROCEDURE — 81003 URINALYSIS AUTO W/O SCOPE: CPT

## 2025-06-03 PROCEDURE — 86901 BLOOD TYPING SEROLOGIC RH(D): CPT

## 2025-06-03 PROCEDURE — 86900 BLOOD TYPING SEROLOGIC ABO: CPT

## 2025-06-03 PROCEDURE — 36415 COLL VENOUS BLD VENIPUNCTURE: CPT

## 2025-06-03 PROCEDURE — 87077 CULTURE AEROBIC IDENTIFY: CPT

## 2025-06-03 PROCEDURE — 85025 COMPLETE CBC W/AUTO DIFF WBC: CPT

## 2025-06-03 PROCEDURE — 80048 BASIC METABOLIC PNL TOTAL CA: CPT

## 2025-06-03 PROCEDURE — 86850 RBC ANTIBODY SCREEN: CPT

## 2025-06-03 PROCEDURE — 87086 URINE CULTURE/COLONY COUNT: CPT

## 2025-06-03 PROCEDURE — 81025 URINE PREGNANCY TEST: CPT

## 2025-06-05 ENCOUNTER — TELEPHONE (OUTPATIENT)
Dept: OBGYN | Facility: CLINIC | Age: 40
End: 2025-06-05
Payer: COMMERCIAL

## 2025-06-05 LAB
BACTERIA UR CULT: NORMAL
SIGNIFICANT IND 70042: NORMAL
SITE SITE: NORMAL
SOURCE SOURCE: NORMAL

## 2025-06-05 NOTE — TELEPHONE ENCOUNTER
Pt called CMA line inquiring on the status of her FMLA. stated she dropped off the paperwork on 05/22/2025 was told that once completed those Forms were going to be sent to HR before her surgery. Informed pt that it takes up to 10 business days for any FMLA/Disability Forms to get completed. (Today is the 10th business day) Informed pt that I will be sending this message to the assigned MA who is working on her FMLA. Pt requested a call back.    Message sent to Cinthya Garcia MA

## 2025-06-05 NOTE — TELEPHONE ENCOUNTER
Caller Name: Pura Bland    Call Back Number: [phone]       I called the pt to let her know her FMLA is complete by me but I just need 's signature and unfortunately  is on vacation this whole week. I let pt know that is all I need and first thing in the morning when  arrives I will get a signature from here and pt provided me an email to send to  and fax number. I let pt know I will send that first thing on Monday morning.

## 2025-06-09 ENCOUNTER — TELEPHONE (OUTPATIENT)
Dept: OBGYN | Facility: CLINIC | Age: 40
End: 2025-06-09
Payer: COMMERCIAL

## 2025-06-09 NOTE — TELEPHONE ENCOUNTER
Caller Name: Pura Bland    Call Back Number: [phone]       How would the patient prefer to be contacted with a response: Phone call OK to leave a detailed message    I called pt to let her know her FMLA is complete and I will fax it over to the phone number and email provided. Pt is aware and call ended.   Pt did provide me with a email but it was not able to be delivered to the email and only able to fax it to the phone number provided . I will get in contact with the pt and let her know .

## 2025-06-10 ENCOUNTER — ANESTHESIA EVENT (OUTPATIENT)
Dept: SURGERY | Facility: MEDICAL CENTER | Age: 40
End: 2025-06-10
Payer: COMMERCIAL

## 2025-06-10 ENCOUNTER — HOSPITAL ENCOUNTER (OUTPATIENT)
Facility: MEDICAL CENTER | Age: 40
End: 2025-06-10
Attending: OBSTETRICS & GYNECOLOGY | Admitting: OBSTETRICS & GYNECOLOGY
Payer: COMMERCIAL

## 2025-06-10 ENCOUNTER — ANESTHESIA (OUTPATIENT)
Dept: SURGERY | Facility: MEDICAL CENTER | Age: 40
End: 2025-06-10
Payer: COMMERCIAL

## 2025-06-10 VITALS
HEIGHT: 63 IN | DIASTOLIC BLOOD PRESSURE: 66 MMHG | WEIGHT: 179.68 LBS | HEART RATE: 83 BPM | TEMPERATURE: 97.4 F | SYSTOLIC BLOOD PRESSURE: 128 MMHG | BODY MASS INDEX: 31.84 KG/M2 | RESPIRATION RATE: 18 BRPM | OXYGEN SATURATION: 98 %

## 2025-06-10 DIAGNOSIS — G89.18 ACUTE POSTOPERATIVE PAIN: Primary | ICD-10-CM

## 2025-06-10 LAB
ABO + RH BLD: NORMAL
HCG UR QL: NEGATIVE
PATHOLOGY CONSULT NOTE: NORMAL

## 2025-06-10 PROCEDURE — 86901 BLOOD TYPING SEROLOGIC RH(D): CPT

## 2025-06-10 PROCEDURE — 160048 HCHG OR STATISTICAL LEVEL 1-5: Performed by: OBSTETRICS & GYNECOLOGY

## 2025-06-10 PROCEDURE — 160025 RECOVERY II MINUTES (STATS): Performed by: OBSTETRICS & GYNECOLOGY

## 2025-06-10 PROCEDURE — A9270 NON-COVERED ITEM OR SERVICE: HCPCS | Performed by: ANESTHESIOLOGY

## 2025-06-10 PROCEDURE — 160191 HCHG ANESTHESIA STANDARD: Performed by: OBSTETRICS & GYNECOLOGY

## 2025-06-10 PROCEDURE — 81025 URINE PREGNANCY TEST: CPT

## 2025-06-10 PROCEDURE — 86900 BLOOD TYPING SEROLOGIC ABO: CPT

## 2025-06-10 PROCEDURE — 160046 HCHG PACU - 1ST 60 MINS PHASE II: Performed by: OBSTETRICS & GYNECOLOGY

## 2025-06-10 PROCEDURE — 160193 HCHG PACU STANDARD - 1ST 60 MINS: Performed by: OBSTETRICS & GYNECOLOGY

## 2025-06-10 PROCEDURE — 700101 HCHG RX REV CODE 250: Performed by: ANESTHESIOLOGY

## 2025-06-10 PROCEDURE — 700111 HCHG RX REV CODE 636 W/ 250 OVERRIDE (IP): Mod: JZ | Performed by: ANESTHESIOLOGY

## 2025-06-10 PROCEDURE — 160002 HCHG RECOVERY MINUTES (STAT): Performed by: OBSTETRICS & GYNECOLOGY

## 2025-06-10 PROCEDURE — 700101 HCHG RX REV CODE 250: Performed by: OBSTETRICS & GYNECOLOGY

## 2025-06-10 PROCEDURE — 160039 HCHG SURGERY MINUTES - EA ADDL 1 MIN LEVEL 3: Performed by: OBSTETRICS & GYNECOLOGY

## 2025-06-10 PROCEDURE — 160047 HCHG PACU  - EA ADDL 30 MINS PHASE II: Performed by: OBSTETRICS & GYNECOLOGY

## 2025-06-10 PROCEDURE — 160028 HCHG SURGERY MINUTES - 1ST 30 MINS LEVEL 3: Performed by: OBSTETRICS & GYNECOLOGY

## 2025-06-10 PROCEDURE — 88302 TISSUE EXAM BY PATHOLOGIST: CPT | Mod: 59 | Performed by: PATHOLOGY

## 2025-06-10 PROCEDURE — 88302 TISSUE EXAM BY PATHOLOGIST: CPT | Mod: 26 | Performed by: PATHOLOGY

## 2025-06-10 PROCEDURE — 700102 HCHG RX REV CODE 250 W/ 637 OVERRIDE(OP): Performed by: ANESTHESIOLOGY

## 2025-06-10 PROCEDURE — 58661 LAPAROSCOPY REMOVE ADNEXA: CPT | Performed by: OBSTETRICS & GYNECOLOGY

## 2025-06-10 PROCEDURE — 160015 HCHG STAT PREOP MINUTES: Performed by: OBSTETRICS & GYNECOLOGY

## 2025-06-10 PROCEDURE — 700105 HCHG RX REV CODE 258: Performed by: ANESTHESIOLOGY

## 2025-06-10 PROCEDURE — 36415 COLL VENOUS BLD VENIPUNCTURE: CPT

## 2025-06-10 RX ORDER — EPHEDRINE SULFATE 50 MG/ML
5 INJECTION, SOLUTION INTRAVENOUS
Status: DISCONTINUED | OUTPATIENT
Start: 2025-06-10 | End: 2025-06-10 | Stop reason: HOSPADM

## 2025-06-10 RX ORDER — LIDOCAINE HYDROCHLORIDE 20 MG/ML
INJECTION, SOLUTION EPIDURAL; INFILTRATION; INTRACAUDAL; PERINEURAL PRN
Status: DISCONTINUED | OUTPATIENT
Start: 2025-06-10 | End: 2025-06-10 | Stop reason: SURG

## 2025-06-10 RX ORDER — HYDROCODONE BITARTRATE AND ACETAMINOPHEN 5; 325 MG/1; MG/1
1 TABLET ORAL EVERY 4 HOURS PRN
Qty: 20 TABLET | Refills: 0 | Status: SHIPPED | OUTPATIENT
Start: 2025-06-10 | End: 2025-06-15

## 2025-06-10 RX ORDER — SUCCINYLCHOLINE CHLORIDE 20 MG/ML
INJECTION INTRAMUSCULAR; INTRAVENOUS PRN
Status: DISCONTINUED | OUTPATIENT
Start: 2025-06-10 | End: 2025-06-10 | Stop reason: SURG

## 2025-06-10 RX ORDER — HALOPERIDOL 5 MG/ML
1 INJECTION INTRAMUSCULAR
Status: DISCONTINUED | OUTPATIENT
Start: 2025-06-10 | End: 2025-06-10 | Stop reason: HOSPADM

## 2025-06-10 RX ORDER — OXYCODONE HCL 5 MG/5 ML
10 SOLUTION, ORAL ORAL
Status: COMPLETED | OUTPATIENT
Start: 2025-06-10 | End: 2025-06-10

## 2025-06-10 RX ORDER — MIDAZOLAM HYDROCHLORIDE 1 MG/ML
INJECTION INTRAMUSCULAR; INTRAVENOUS PRN
Status: DISCONTINUED | OUTPATIENT
Start: 2025-06-10 | End: 2025-06-10 | Stop reason: SURG

## 2025-06-10 RX ORDER — LABETALOL HYDROCHLORIDE 5 MG/ML
5 INJECTION, SOLUTION INTRAVENOUS
Status: DISCONTINUED | OUTPATIENT
Start: 2025-06-10 | End: 2025-06-10 | Stop reason: HOSPADM

## 2025-06-10 RX ORDER — CELECOXIB 200 MG/1
400 CAPSULE ORAL ONCE
Status: COMPLETED | OUTPATIENT
Start: 2025-06-10 | End: 2025-06-10

## 2025-06-10 RX ORDER — DIPHENHYDRAMINE HYDROCHLORIDE 50 MG/ML
12.5 INJECTION, SOLUTION INTRAMUSCULAR; INTRAVENOUS
Status: DISCONTINUED | OUTPATIENT
Start: 2025-06-10 | End: 2025-06-10 | Stop reason: HOSPADM

## 2025-06-10 RX ORDER — OXYCODONE HCL 5 MG/5 ML
5 SOLUTION, ORAL ORAL
Status: COMPLETED | OUTPATIENT
Start: 2025-06-10 | End: 2025-06-10

## 2025-06-10 RX ORDER — BUPIVACAINE HYDROCHLORIDE AND EPINEPHRINE 2.5; 5 MG/ML; UG/ML
INJECTION, SOLUTION EPIDURAL; INFILTRATION; INTRACAUDAL; PERINEURAL
Status: DISCONTINUED | OUTPATIENT
Start: 2025-06-10 | End: 2025-06-10 | Stop reason: HOSPADM

## 2025-06-10 RX ORDER — LIDOCAINE HYDROCHLORIDE 40 MG/ML
SOLUTION TOPICAL PRN
Status: DISCONTINUED | OUTPATIENT
Start: 2025-06-10 | End: 2025-06-10 | Stop reason: SURG

## 2025-06-10 RX ORDER — HYDROMORPHONE HYDROCHLORIDE 1 MG/ML
0.1 INJECTION, SOLUTION INTRAMUSCULAR; INTRAVENOUS; SUBCUTANEOUS
Status: DISCONTINUED | OUTPATIENT
Start: 2025-06-10 | End: 2025-06-10 | Stop reason: HOSPADM

## 2025-06-10 RX ORDER — HYDROMORPHONE HYDROCHLORIDE 1 MG/ML
0.2 INJECTION, SOLUTION INTRAMUSCULAR; INTRAVENOUS; SUBCUTANEOUS
Status: DISCONTINUED | OUTPATIENT
Start: 2025-06-10 | End: 2025-06-10 | Stop reason: HOSPADM

## 2025-06-10 RX ORDER — SCOPOLAMINE 1 MG/3D
1 PATCH, EXTENDED RELEASE TRANSDERMAL
Status: DISCONTINUED | OUTPATIENT
Start: 2025-06-10 | End: 2025-06-10 | Stop reason: HOSPADM

## 2025-06-10 RX ORDER — SODIUM CHLORIDE, SODIUM LACTATE, POTASSIUM CHLORIDE, CALCIUM CHLORIDE 600; 310; 30; 20 MG/100ML; MG/100ML; MG/100ML; MG/100ML
INJECTION, SOLUTION INTRAVENOUS
Status: DISCONTINUED | OUTPATIENT
Start: 2025-06-10 | End: 2025-06-10 | Stop reason: SURG

## 2025-06-10 RX ORDER — HYDRALAZINE HYDROCHLORIDE 20 MG/ML
5 INJECTION INTRAMUSCULAR; INTRAVENOUS
Status: DISCONTINUED | OUTPATIENT
Start: 2025-06-10 | End: 2025-06-10 | Stop reason: HOSPADM

## 2025-06-10 RX ORDER — ALBUTEROL SULFATE 5 MG/ML
2.5 SOLUTION RESPIRATORY (INHALATION)
Status: DISCONTINUED | OUTPATIENT
Start: 2025-06-10 | End: 2025-06-10 | Stop reason: HOSPADM

## 2025-06-10 RX ORDER — SODIUM CHLORIDE, SODIUM LACTATE, POTASSIUM CHLORIDE, CALCIUM CHLORIDE 600; 310; 30; 20 MG/100ML; MG/100ML; MG/100ML; MG/100ML
INJECTION, SOLUTION INTRAVENOUS CONTINUOUS
Status: DISCONTINUED | OUTPATIENT
Start: 2025-06-10 | End: 2025-06-10 | Stop reason: HOSPADM

## 2025-06-10 RX ORDER — HYDROMORPHONE HYDROCHLORIDE 1 MG/ML
0.4 INJECTION, SOLUTION INTRAMUSCULAR; INTRAVENOUS; SUBCUTANEOUS
Status: DISCONTINUED | OUTPATIENT
Start: 2025-06-10 | End: 2025-06-10 | Stop reason: HOSPADM

## 2025-06-10 RX ORDER — MAGNESIUM SULFATE HEPTAHYDRATE 40 MG/ML
INJECTION, SOLUTION INTRAVENOUS PRN
Status: DISCONTINUED | OUTPATIENT
Start: 2025-06-10 | End: 2025-06-10 | Stop reason: SURG

## 2025-06-10 RX ORDER — METHOCARBAMOL 500 MG/1
500 TABLET, FILM COATED ORAL ONCE
Status: COMPLETED | OUTPATIENT
Start: 2025-06-10 | End: 2025-06-10

## 2025-06-10 RX ORDER — ONDANSETRON 2 MG/ML
INJECTION INTRAMUSCULAR; INTRAVENOUS PRN
Status: DISCONTINUED | OUTPATIENT
Start: 2025-06-10 | End: 2025-06-10 | Stop reason: SURG

## 2025-06-10 RX ORDER — ACETAMINOPHEN 500 MG
1000 TABLET ORAL ONCE
Status: COMPLETED | OUTPATIENT
Start: 2025-06-10 | End: 2025-06-10

## 2025-06-10 RX ORDER — SODIUM CHLORIDE, SODIUM LACTATE, POTASSIUM CHLORIDE, CALCIUM CHLORIDE 600; 310; 30; 20 MG/100ML; MG/100ML; MG/100ML; MG/100ML
INJECTION, SOLUTION INTRAVENOUS CONTINUOUS
Status: ACTIVE | OUTPATIENT
Start: 2025-06-10 | End: 2025-06-10

## 2025-06-10 RX ORDER — ROCURONIUM BROMIDE 10 MG/ML
INJECTION, SOLUTION INTRAVENOUS PRN
Status: DISCONTINUED | OUTPATIENT
Start: 2025-06-10 | End: 2025-06-10 | Stop reason: SURG

## 2025-06-10 RX ORDER — BUPIVACAINE HYDROCHLORIDE AND EPINEPHRINE 2.5; 5 MG/ML; UG/ML
INJECTION, SOLUTION EPIDURAL; INFILTRATION; INTRACAUDAL; PERINEURAL
Status: DISCONTINUED
Start: 2025-06-10 | End: 2025-06-10 | Stop reason: HOSPADM

## 2025-06-10 RX ORDER — IBUPROFEN 800 MG/1
800 TABLET, FILM COATED ORAL EVERY 8 HOURS PRN
Qty: 30 TABLET | Refills: 2 | Status: SHIPPED | OUTPATIENT
Start: 2025-06-10

## 2025-06-10 RX ORDER — SENNA AND DOCUSATE SODIUM 50; 8.6 MG/1; MG/1
1 TABLET, FILM COATED ORAL DAILY
Qty: 30 TABLET | Refills: 11 | Status: SHIPPED | OUTPATIENT
Start: 2025-06-10

## 2025-06-10 RX ADMIN — FENTANYL CITRATE 50 MCG: 50 INJECTION, SOLUTION INTRAMUSCULAR; INTRAVENOUS at 08:40

## 2025-06-10 RX ADMIN — MAGNESIUM SULFATE HEPTAHYDRATE 4 G: 2 INJECTION, SOLUTION INTRAVENOUS at 07:33

## 2025-06-10 RX ADMIN — OXYCODONE HYDROCHLORIDE 10 MG: 5 SOLUTION ORAL at 08:40

## 2025-06-10 RX ADMIN — ROCURONIUM BROMIDE 20 MG: 10 INJECTION INTRAVENOUS at 07:33

## 2025-06-10 RX ADMIN — CELECOXIB 400 MG: 200 CAPSULE ORAL at 06:26

## 2025-06-10 RX ADMIN — ONDANSETRON 8 MG: 2 INJECTION INTRAMUSCULAR; INTRAVENOUS at 08:13

## 2025-06-10 RX ADMIN — SODIUM CHLORIDE, POTASSIUM CHLORIDE, SODIUM LACTATE AND CALCIUM CHLORIDE: 600; 310; 30; 20 INJECTION, SOLUTION INTRAVENOUS at 07:27

## 2025-06-10 RX ADMIN — LIDOCAINE HYDROCHLORIDE 4 ML: 40 SOLUTION TOPICAL at 07:31

## 2025-06-10 RX ADMIN — ACETAMINOPHEN 1000 MG: 500 TABLET ORAL at 06:26

## 2025-06-10 RX ADMIN — SUGAMMADEX 200 MG: 100 INJECTION, SOLUTION INTRAVENOUS at 08:17

## 2025-06-10 RX ADMIN — PROPOFOL 50 MCG/KG/MIN: 10 INJECTION, EMULSION INTRAVENOUS at 07:33

## 2025-06-10 RX ADMIN — METHOCARBAMOL 500 MG: 500 TABLET ORAL at 06:26

## 2025-06-10 RX ADMIN — MIDAZOLAM HYDROCHLORIDE 2 MG: 1 INJECTION, SOLUTION INTRAMUSCULAR; INTRAVENOUS at 07:27

## 2025-06-10 RX ADMIN — PROPOFOL 150 MG: 10 INJECTION, EMULSION INTRAVENOUS at 07:31

## 2025-06-10 RX ADMIN — LIDOCAINE HYDROCHLORIDE 100 MG: 20 INJECTION, SOLUTION EPIDURAL; INFILTRATION; INTRACAUDAL; PERINEURAL at 07:27

## 2025-06-10 RX ADMIN — SCOPOLAMINE 1 PATCH: 1.5 PATCH, EXTENDED RELEASE TRANSDERMAL at 06:26

## 2025-06-10 RX ADMIN — SUCCINYLCHOLINE CHLORIDE 80 MG: 20 INJECTION, SOLUTION INTRAMUSCULAR; INTRAVENOUS at 07:31

## 2025-06-10 RX ADMIN — FENTANYL CITRATE 50 MCG: 50 INJECTION, SOLUTION INTRAMUSCULAR; INTRAVENOUS at 09:15

## 2025-06-10 ASSESSMENT — PAIN DESCRIPTION - PAIN TYPE
TYPE: SURGICAL PAIN

## 2025-06-10 ASSESSMENT — PAIN SCALES - GENERAL: PAIN_LEVEL: 6

## 2025-06-10 ASSESSMENT — FIBROSIS 4 INDEX
FIB4 SCORE: 1.02
FIB4 SCORE: 1.02

## 2025-06-10 NOTE — ANESTHESIA PREPROCEDURE EVALUATION
Case: [id number] Date/Time: 06/10/25 0715    Procedure: LAPAROSCOPIC BILATERAL SALPINGECTOMY AND ANY OTHER INDICATED PROCEDURES. (Bilateral: Pelvis)    Anesthesia type: General    Pre-op diagnosis: PERMANENT STERILIZATION    Location: [facility] ROOM 22 / SURGERY SAME DAY [facility]    Surgeons: Karrie Sewell M.D.          40yo F here for lap salpingectomy    Relevant Problems   No relevant active problems       Physical Exam    Airway   Mallampati: II  TM distance: >3 FB  Neck ROM: full       Cardiovascular - normal exam  Rhythm: regular  Rate: normal    (-) murmur     Dental - normal exam           Pulmonary - normal examBreath sounds clear to auscultation     Abdominal    Neurological - normal exam                   Anesthesia Plan    ASA 2       Plan - general       Airway plan will be ETT          Induction: intravenous    Postoperative Plan: Postoperative administration of opioids is intended.    Pertinent diagnostic labs and testing reviewed    Informed Consent:    Anesthetic plan and risks discussed with patient.    Use of blood products discussed with: patient whom consented to blood products.

## 2025-06-10 NOTE — OR SURGEON
Immediate Post OP Note    PreOp Diagnosis: 38 yo  desiring surgical sterilization      PostOp Diagnosis: Same as above      Procedure(s):  LAPAROSCOPIC BILATERAL SALPINGECTOMY - Wound Class: Clean Contaminated    Surgeon(s):  STEPHANIE Pierce M.D.    Anesthesiologist/Type of Anesthesia:  Anesthesiologist: Maria Del Carmen Pinzon M.D./General    Surgical Staff:  Circulator: Susan Kerr R.N.  Scrub Person: Ingrid Samayoa    Specimens removed if any:  ID Type Source Tests Collected by Time Destination   A : right fallopian tube Tissue Fallopian Tube PATHOLOGY SPECIMEN Karrie Sewell M.D. 6/10/2025  8:08 AM    B : left fallopian tube Tissue Fallopian Tube PATHOLOGY SPECIMEN Karrie Sewell M.D. 6/10/2025  8:10 AM        Estimated Blood Loss: Minimal    Findings: Normal appearing uterus, tubes and ovaries bilaterally.     Complications: None        6/10/2025 8:29 AM Karrie Sewell M.D.

## 2025-06-10 NOTE — ANESTHESIA TIME REPORT
Anesthesia Start and Stop Event Times       Date Time Event    6/10/2025 0716 Ready for Procedure     0727 Anesthesia Start     0830 Anesthesia Stop          Responsible Staff  06/10/25      Name Role Begin End    Maria Del Carmen Pinzon M.D. Anesth 0727 0830          Overtime Reason:  no overtime (within assigned shift)    Comments:

## 2025-06-10 NOTE — OP REPORT
Operative Note    Patient: Pura Bland  MRN: [id number]   YOB: 1985   Age: 39 y.o.   Sex:female  Unit: [facility]  [facility]  Location: [facility]      Date of Procedure: 6/10/2025     Preoperative Diagnosis: PERMANENT STERILIZATION  Postoperative Diagnosis: PERMANENT STERILIZATION   Procedure: Procedure(s):  LAPAROSCOPIC BILATERAL SALPINGECTOMY  Surgeon: Surgeons and Role:     * Karrie Sewell M.D. - Primary     * Sandra Carney M.D.  Anesthesia: General   Antibiotics: none  Estimated Blood Loss: Minimal    Specimens: bilateral fallopian tubes    Findings: Normal appearing uterus, tubes and ovaries bilaterally     Complications: None      INDICATIONS:   39 y.o. presents for operative laparoscopy for permanent sterilization.  The risks, benefits and alternatives of laparoscopy were discussed with the patient, including but not limited to infection, disfiguring scar, severe loss of blood, venous thrombosis, injury to bowel, bladder or ureter, injury to blood vessels, and rare chance of needing to convert to laparotomy to complete the surgery or any repair.  She was counseled on the permanent/irreversible nature of removal of fallopian tubes for sterilization.       PROCEDURE:   The patient was taken to the operating room where general anesthesia was undertaken and found to be adequate. She was then prepped and draped in the dorsal lithotomy position.  A time out was taken.  Sponge stick was placed in the vaginal vault.     Gloves were changed then attention was then turned to the abdomen.  The devan umbilical region was anesthetized with 0.25% marcaine plain.  A 5-mm skin incision was then made in the umbilicus. Two towel clamps were applied to the lateral edges of the umbilicus and the abdominal wall was tented upwards. A Veress needle was then placed into the abdomen. The water drop test was used to confirm intraperitoneal placement, and  the Veress needle was then hooked up to CO2 gas. Opening pressure was noted at 3 mmHg, and the abdomen was insufflated without difficulty.  Veress needle was removed and 5-mm trocar was placed with direct visualization into the peritoneum cavity without difficulty.  Two lateral 5mm ports were placed on left and right in the same manner under direct visualization.  At this time a thorough investigation of the pelvis was performed. The investigation revealed the findings listed above.    The first tube was grasped with a blunt grasper and elevated and then the Harmonic scalpel was used to ligate the tubo-ovarian ligament.  Dissection was carried along the meosalpinx to the uterine cornua.  The fallopian tube was then transected and the specimen detached. Procedure was repeated on the contralateral side.     Instruments were removed from the abdomen and the abdomen was relieved of all CO2 gas. The trocars were removed from the abdomen. The skin incisions were then closed with a 4-0 Vicryl stitch. The skin incisions were sealed with Dermabond. The uterine manipulator was removed from vaginal vault without difficulty. Hemostasis was noted.  The patient was extubated and  taken to the PACU in stable condition. Total blood loss was minimal, < 1 ml.     Sponge, lap and needle counts were correct.    Patient tolerated the procedure well.     Karrie Sewell M.D.    Obstetrics and Gynecology    6/10/2025 8:32 AM

## 2025-06-10 NOTE — OR NURSING
0825 - Pt to PACU from OR. Report from anesthesia and OR RN. On 6L O2 via mask. Respirations even and unlabored. VSS. X3 lap sites to abdomen, left site is slightly oozy otherwise the other two are CDI, soft with dermabond. Peripad is clean.    0840 - Medicated per MAR for pain. Drinking water without complications.    0900 - Mother given a call with an update.    0915 - Subsequent dose given for pain.    1000 - Patient still asleep, mother called with an update. Patient appears comfortable.    1135 - Patient waking up now. Mother now at bedside. Patient reports 1/10 pain.    1145 - Patient up to bathroom, successful in voiding and getting dressed with her mothers help.    1200 - Discharge instructions discussed with mother Jaye. All questions answered. Verbalized understanding.    1202 - Pt escorted out of department in wheelchair with all belongings. Discharged home to responsible adult. PIV removed with tip intact.

## 2025-06-10 NOTE — DISCHARGE INSTRUCTIONS
If any questions arise, call your provider.  If your provider is not available, please feel free to call the [facility] at [phone].    MEDICATIONS: Resume taking daily medication.  Take prescribed pain medication with food.  If no medication is prescribed, you may take non-aspirin pain medication if needed.  PAIN MEDICATION CAN BE VERY CONSTIPATING.  Take a stool softener or laxative such as senokot, pericolace, or milk of magnesia if needed.    Last pain medication given: 1,000 mg Tylenol and Celebrex (like ibuprofen / motrin) and Robaxin given at 6:30 am.   Ok to take either tylenol or ibuprofen after 12:30 pm as needed for pain.   Oxycodone given at 8:40 am. Can have Norco after 12:40 pm, but do not take additional Tylenol with Norco because it already has tylenol in it.     Keep scopolamine patch in place for 72 hours. Wash hands thoroughly after removing. Ok to remove patch earlier if you experience disorientation, dizziness, eye pain, heart palpitations, confusion, etc.     What to Expect Post Anesthesia    Rest and take it easy for the first 24 hours.  A responsible adult is recommended to remain with you during that time.  It is normal to feel sleepy.  We encourage you to not do anything that requires balance, judgment or coordination.    FOR 24 HOURS DO NOT:  Drive, operate machinery or run household appliances.  Drink beer or alcoholic beverages.  Make important decisions or sign legal documents.    To avoid nausea, slowly advance diet as tolerated, avoiding spicy or greasy foods for the first day.  Add more substantial food to your diet according to your provider's instructions.  Babies can be fed formula or breast milk as soon as they are hungry.  INCREASE FLUIDS AND FIBER TO AVOID CONSTIPATION.    MILD FLU-LIKE SYMPTOMS ARE NORMAL.  YOU MAY EXPERIENCE GENERALIZED MUSCLE ACHES, THROAT IRRITATION, HEADACHE AND/OR SOME NAUSEA.

## 2025-06-10 NOTE — ANESTHESIA PROCEDURE NOTES
Airway    Date/Time: 6/10/2025 7:31 AM    Performed by: Maria Del Carmen Pinzon M.D.  Authorized by: Maria Del Carmen Pinzon M.D.    Location:  OR  Urgency:  Elective  Indications for Airway Management:  Anesthesia      Spontaneous Ventilation: absent    Sedation Level:  Deep  Preoxygenated: Yes    Patient Position:  Sniffing  Mask Difficulty Assessment:  0 - not attempted  Final Airway Type:  Endotracheal airway  Final Endotracheal Airway:  ETT  Cuffed: Yes    Technique Used for Successful ETT Placement:  Direct laryngoscopy    Insertion Site:  Oral  Blade Type:  Alise  Laryngoscope Blade/Videolaryngoscope Blade Size:  3  ETT Size (mm):  7.0  Measured from:  Teeth  ETT to Teeth (cm):  22  Placement Verified by: auscultation and capnometry    Cormack-Lehane Classification:  Grade I - full view of glottis  Number of Attempts at Approach:  1

## 2025-06-10 NOTE — ANESTHESIA POSTPROCEDURE EVALUATION
Patient: Pura Bland    Procedure Summary       Date: 06/10/25 Room / Location: [facility] ROOM 22 / SURGERY SAME DAY [facility]    Anesthesia Start: 0727 Anesthesia Stop: 0830    Procedure: LAPAROSCOPIC BILATERAL SALPINGECTOMY (Bilateral: Pelvis) Diagnosis: (PERMANENT STERILIZATION)    Surgeons: Karrie Sewell M.D. Responsible Provider: Maria Del Carmen Pinzon M.D.    Anesthesia Type: general ASA Status: 2            Final Anesthesia Type: general  Last vitals  BP   Blood Pressure: 113/66    Temp   36.3 °C (97.4 °F)    Pulse   69   Resp   12    SpO2   98 %      Anesthesia Post Evaluation    Patient location during evaluation: PACU  Patient participation: complete - patient participated  Level of consciousness: awake and alert  Pain score: 6    Airway patency: patent  Anesthetic complications: no  Cardiovascular status: hemodynamically stable  Respiratory status: acceptable  Hydration status: euvolemic    PONV: none          No notable events documented.

## 2025-06-26 ENCOUNTER — GYNECOLOGY VISIT (OUTPATIENT)
Dept: OBGYN | Facility: CLINIC | Age: 40
End: 2025-06-26
Payer: COMMERCIAL

## 2025-06-26 VITALS
HEIGHT: 62 IN | BODY MASS INDEX: 33.42 KG/M2 | WEIGHT: 181.6 LBS | SYSTOLIC BLOOD PRESSURE: 135 MMHG | DIASTOLIC BLOOD PRESSURE: 72 MMHG

## 2025-06-26 DIAGNOSIS — Z09 POSTOP CHECK: Primary | ICD-10-CM

## 2025-06-26 PROCEDURE — 99024 POSTOP FOLLOW-UP VISIT: CPT | Performed by: OBSTETRICS & GYNECOLOGY

## 2025-06-26 PROCEDURE — 3075F SYST BP GE 130 - 139MM HG: CPT | Performed by: OBSTETRICS & GYNECOLOGY

## 2025-06-26 PROCEDURE — 3078F DIAST BP <80 MM HG: CPT | Performed by: OBSTETRICS & GYNECOLOGY

## 2025-06-26 ASSESSMENT — FIBROSIS 4 INDEX: FIB4 SCORE: 1.02

## 2025-06-26 NOTE — PROGRESS NOTES
Pt here for 2 week post op visit  Type: bilateral salpingectomy  Date:6.10.25  Pt states pain in hand of iv site.  Phone/pharm verified: [phone]

## 2025-06-26 NOTE — PROGRESS NOTES
"Postoperative Follow Up Visit    Pura Bland is a 39 y.o. female    Chief complaint    Chief Complaint   Patient presents with    Post-op     2 week post op       S/p L/S bilateral salpingectomy on 6/10/25 for sterilization presenting for postop check    COMPLAINTS:    Reports pain at wrist, wondering if it was from the IV site. No swelling or redness. Able to move hand and no numbness.   No fever/chills, abdominal pain, nausea/vomiting, problems with bowel/bladder.       OBJECTIVE:    /72 (BP Location: Left arm, Patient Position: Sitting, BP Cuff Size: Large adult)   Ht 5' 2\"   Wt 181 lb 9.6 oz   LMP 06/16/2025 (Within Days)   BMI 33.22 kg/m²     General: No acute distress, well nourished, alert.     ABDOMEN: Soft nondistended, nontender, no peritoneal signs, no masses.     INCISIONS: Clean, dry, intact, no drainage, erythema or separation.    Left wrist with no swelling, erythema, masses. Normal range of motion.     A/P    1. Postop check        S/p L/S bilateral salpingectomy on 6/10/25 for sterilization doing well.    L wrist pain - unlikely caused by IV. Will continue to observe, recommend continuing tylenol/ibuprofen and f/u with PCP. May need physical therapy.      Postop precautions and limitations again reviewed. Lifting and exercise precautions until next check.     Follow up in 4 weeks for final postop check.     Karrie Sewell M.D.    Obstetrics and Gynecology    6/26/202511:40 AM  "

## 2025-08-20 ENCOUNTER — APPOINTMENT (OUTPATIENT)
Dept: OBGYN | Facility: CLINIC | Age: 40
End: 2025-08-20
Payer: COMMERCIAL

## 2025-08-20 ASSESSMENT — LIFESTYLE VARIABLES
HOW MANY STANDARD DRINKS CONTAINING ALCOHOL DO YOU HAVE ON A TYPICAL DAY: 1 OR 2
HOW OFTEN DO YOU HAVE A DRINK CONTAINING ALCOHOL: MONTHLY OR LESS
AUDIT-C TOTAL SCORE: 1
SKIP TO QUESTIONS 9-10: 1
HOW OFTEN DO YOU HAVE SIX OR MORE DRINKS ON ONE OCCASION: NEVER

## 2025-08-20 ASSESSMENT — FIBROSIS 4 INDEX: FIB4 SCORE: 1.02

## (undated) DEVICE — Device

## (undated) DEVICE — LACTATED RINGERS INJ 1000 ML - (14EA/CA 60CA/PF)

## (undated) DEVICE — CANISTER SUCTION 3000ML MECHANICAL FILTER AUTO SHUTOFF MEDI-VAC NONSTERILE LF DISP (40EA/CA)

## (undated) DEVICE — GOWN WARMING STANDARD FLEX - (30/CA)

## (undated) DEVICE — GOWN SURGEONS X-LARGE - DISP. (30/CA)

## (undated) DEVICE — SUCTION INSTRUMENT YANKAUER BULBOUS TIP W/O VENT (50EA/CA)

## (undated) DEVICE — SUTURE 4-0 MONOCRYL PLUS PS-2 - 27 INCH (36/BX)

## (undated) DEVICE — CANNULA O2 COMFORT SOFT EAR ADULT 7 FT TUBING (50/CA)

## (undated) DEVICE — PAD SANITARY 11IN MAXI IND WRAPPED (12EA/PK 24PK/CA)

## (undated) DEVICE — SUTURE GENERAL

## (undated) DEVICE — CANISTER SUCTION RIGID RED 1500CC (40EA/CA)

## (undated) DEVICE — DRAPESURG STERI-DRAPE LONG - (10/BX 4BX/CA)

## (undated) DEVICE — CANNULA W/SEAL 5X100 Z-THRE - ADED KII (12/BX)

## (undated) DEVICE — TROCAR 5X100 SEPARTATOR ADV - FIXATION (6/BX)

## (undated) DEVICE — MASK OXYGEN VNYL ADLT MED CONC WITH 7 FOOT TUBING - (50EA/CA)

## (undated) DEVICE — SYSTEM CLEARIFY VISUALIZATION (10EA/PK)

## (undated) DEVICE — TUBE E-T HI-LO CUFF 7.0MM (10EA/PK)

## (undated) DEVICE — GLOVE BIOGEL INDICATOR SZ 7SURGICAL PF LTX - (50/BX 4BX/CA)

## (undated) DEVICE — SET LEADWIRE 5 LEAD BEDSIDE DISPOSABLE ECG (1SET OF 5/EA)

## (undated) DEVICE — GLOVE BIOGEL PI INDICATOR SZ 7.0 SURGICAL PF LF - (50/BX 4BX/CA)

## (undated) DEVICE — TOWEL STOP TIMEOUT SAFETY FLAG (40EA/CA)

## (undated) DEVICE — TRAY FOLEY CATHETER STATLOCK 16FR SURESTEP (10EA/CA)

## (undated) DEVICE — SENSOR OXIMETER ADULT SPO2 RD SET (20EA/BX)

## (undated) DEVICE — WATER IRRIGATION STERILE 1000ML (12EA/CA)

## (undated) DEVICE — NEEDLE INSFL 120MM 14GA VRRS - (20/BX)

## (undated) DEVICE — SLEEVE VASO DVT COMPRESSION CALF MED - (10PR/CA)

## (undated) DEVICE — KIT  I.V. START (100EA/CA)

## (undated) DEVICE — ELECTRODE DUAL RETURN W/ CORD - (50/PK)

## (undated) DEVICE — TUBE CONNECTING SUCTION - CLEAR PLASTIC STERILE 72 IN (50EA/CA)

## (undated) DEVICE — TROCAR 5X100 BLADED ADVANCE - FIXATION (6/BX)

## (undated) DEVICE — TUBING CLEARLINK DUO-VENT - C-FLO (48EA/CA)

## (undated) DEVICE — SET TUBING PNEUMOCLEAR HIGH FLOW SMOKE EVACUATION (10EA/BX)

## (undated) DEVICE — GLOVE BIOGEL INDICATOR SZ 6.5 SURGICAL PF LTX - (50PR/BX 4BX/CA)

## (undated) DEVICE — GLOVE BIOGEL SZ 6.5 SURGICAL PF LTX (50PR/BX 4BX/CA)

## (undated) DEVICE — DERMABOND ADVANCED - (12EA/BX)

## (undated) DEVICE — GLOVE SZ 6.5 BIOGEL PI MICRO - PF LF (50PR/BX)